# Patient Record
Sex: FEMALE | Race: WHITE | NOT HISPANIC OR LATINO | Employment: OTHER | URBAN - METROPOLITAN AREA
[De-identification: names, ages, dates, MRNs, and addresses within clinical notes are randomized per-mention and may not be internally consistent; named-entity substitution may affect disease eponyms.]

---

## 2017-02-05 ENCOUNTER — PATIENT OUTREACH (OUTPATIENT)
Dept: HEALTH INFORMATION MANAGEMENT | Facility: OTHER | Age: 82
End: 2017-02-05

## 2017-02-05 ENCOUNTER — TELEPHONE (OUTPATIENT)
Dept: HEALTH INFORMATION MANAGEMENT | Facility: OTHER | Age: 82
End: 2017-02-05

## 2017-02-05 NOTE — PROGRESS NOTES
Outcome:LEFT VM    Attempt # 1ST    Annual Wellness Visit Scheduling  Scheduling Status: LEFT VM  Care Gap Scheduling (Attempt to Schedule EACH Overdue Care Gap!)  Health Maintenance Due   Topic Date Due   • Annual Wellness Visit  1935   • PAP SMEAR  02/08/1956   • COLONOSCOPY  02/08/1985   • IMM ZOSTER VACCINE  02/08/1995   • BONE DENSITY  02/08/2000   • MAMMOGRAM  07/29/2011         MyChart Activation:  Already Active/Declined/Sent Activation Code

## 2017-02-05 NOTE — TELEPHONE ENCOUNTER
Patient is over the age of 75 and showing overdue for PAP SMEAR, MAMMO, BONE DENSITY, COLONOSCOPY  .    Please reply to this message as to whether these tests are appropriate and I will update the Health Maintenance Topic or contact the patient to schedule.

## 2017-02-06 NOTE — PROGRESS NOTES
Outcome:SCHEDULED ANNUAL WELLNESS VISIT     Attempt # INCOMING CALL     Annual Wellness Visit Scheduling  Scheduling Status: Scheduled; Not Scheduled; SCHEDULED   If Not Scheduled, Choose Reason Why:         Care Gap Scheduling (Attempt to Schedule EACH Overdue Care Gap!)  Health Maintenance Due   Topic Date Due   • Annual Wellness Visit  SCHEDULED ANNUAL    • BONE DENSITY  02/08/2000         MyChart Activation:  Already Active/Declined/Sent Activation Code  NA

## 2017-02-28 ENCOUNTER — APPOINTMENT (OUTPATIENT)
Dept: MEDICAL GROUP | Facility: MEDICAL CENTER | Age: 82
End: 2017-02-28
Payer: MEDICARE

## 2017-03-06 ENCOUNTER — PATIENT OUTREACH (OUTPATIENT)
Dept: HEALTH INFORMATION MANAGEMENT | Facility: OTHER | Age: 82
End: 2017-03-06

## 2017-03-06 NOTE — PROGRESS NOTES
Attempt #:1     Annual Wellness Visit Scheduling  1. Scheduling Status:Scheduled - WILL DO THE AWV WITH PCP AT UPCOMING APPOINTMENT    Care Gap Scheduling (Attempt to Schedule EACH Overdue Care Gap!)     Health Maintenance Due   Topic Date Due   • Annual Wellness Visit  WILL DO THE AWV WITH PCP AT UPCOMING APPOINTMENT   • BONE DENSITY  DID NOT DISCUSS         MyChart Activation: declined

## 2017-03-14 ENCOUNTER — OFFICE VISIT (OUTPATIENT)
Dept: MEDICAL GROUP | Facility: PHYSICIAN GROUP | Age: 82
End: 2017-03-14
Payer: MEDICARE

## 2017-03-14 VITALS
SYSTOLIC BLOOD PRESSURE: 130 MMHG | OXYGEN SATURATION: 93 % | TEMPERATURE: 97.3 F | RESPIRATION RATE: 12 BRPM | HEART RATE: 69 BPM | DIASTOLIC BLOOD PRESSURE: 68 MMHG | WEIGHT: 157 LBS | HEIGHT: 60 IN | BODY MASS INDEX: 30.82 KG/M2

## 2017-03-14 DIAGNOSIS — I63.319 CEREBROVASCULAR ACCIDENT (CVA) DUE TO THROMBOSIS OF MIDDLE CEREBRAL ARTERY, UNSPECIFIED BLOOD VESSEL LATERALITY (HCC): ICD-10-CM

## 2017-03-14 DIAGNOSIS — F43.21 GRIEF: ICD-10-CM

## 2017-03-14 DIAGNOSIS — E66.9 OBESITY (BMI 30-39.9): ICD-10-CM

## 2017-03-14 DIAGNOSIS — F41.9 ANXIETY: ICD-10-CM

## 2017-03-14 DIAGNOSIS — F32.89 OTHER DEPRESSION: ICD-10-CM

## 2017-03-14 DIAGNOSIS — D86.9 SARCOIDOSIS: ICD-10-CM

## 2017-03-14 PROCEDURE — G8432 DEP SCR NOT DOC, RNG: HCPCS | Performed by: FAMILY MEDICINE

## 2017-03-14 PROCEDURE — 4040F PNEUMOC VAC/ADMIN/RCVD: CPT | Performed by: FAMILY MEDICINE

## 2017-03-14 PROCEDURE — G8482 FLU IMMUNIZE ORDER/ADMIN: HCPCS | Performed by: FAMILY MEDICINE

## 2017-03-14 PROCEDURE — G8419 CALC BMI OUT NRM PARAM NOF/U: HCPCS | Performed by: FAMILY MEDICINE

## 2017-03-14 PROCEDURE — 1036F TOBACCO NON-USER: CPT | Performed by: FAMILY MEDICINE

## 2017-03-14 PROCEDURE — 99214 OFFICE O/P EST MOD 30 MIN: CPT | Performed by: FAMILY MEDICINE

## 2017-03-14 PROCEDURE — 1101F PT FALLS ASSESS-DOCD LE1/YR: CPT | Performed by: FAMILY MEDICINE

## 2017-03-14 PROCEDURE — G8598 ASA/ANTIPLAT THER USED: HCPCS | Performed by: FAMILY MEDICINE

## 2017-03-14 ASSESSMENT — PATIENT HEALTH QUESTIONNAIRE - PHQ9
CLINICAL INTERPRETATION OF PHQ2 SCORE: 6
SUM OF ALL RESPONSES TO PHQ QUESTIONS 1-9: 17
5. POOR APPETITE OR OVEREATING: 3 - NEARLY EVERY DAY

## 2017-03-14 NOTE — MR AVS SNAPSHOT
"        Alix Rush   3/14/2017 1:40 PM   Office Visit   MRN: [id number]    Department:  [facility]   Dept Phone:  [phone]    Description:  Female : 1935   Provider:  Yoav Wakefield M.D.           Reason for Visit     Follow-Up dizzy in the AM       Allergies as of 3/14/2017     Allergen Noted Reactions    Bupropion 2009       Cymbalta [Duloxetine Hcl] 2009   Nausea    Darvocet [Propoxyphene N-Apap] 10/22/2009       Dizziness      Fluoxetine 2009       suicidal thoughts       Iodine 2009       Lexapro 2009       Lipitor [Atorvastatin Calcium] 2009       muscle aches        You were diagnosed with     Grief   [291021]       Cerebrovascular accident (CVA) due to thrombosis of middle cerebral artery, unspecified blood vessel laterality (CMS-[facility])   [6848288]       Obesity (BMI 30-39.9)   [315118]       Other depression   [9571193]       Sarcoidosis (CMS-HCC)   [135.ICD-9-CM]         Vital Signs     Blood Pressure Pulse Temperature Respirations Height Weight    130/68 mmHg 69 36.3 °C (97.3 °F) 12 1.511 m (4' 11.5\") 71.215 kg (157 lb)    Body Mass Index Oxygen Saturation Last Menstrual Period Smoking Status          31.19 kg/m2 93% 1974 Never Smoker         Basic Information     Date Of Birth Sex Race Ethnicity Preferred Language    1935 Female White Non- English      Your appointments     May 23, 2017  8:40 AM   Established Patient with Yoav Wakefield M.D.   [facility] ([address] [address]   [phone]           You will be receiving a confirmation call a few days before your appointment from our automated call confirmation system.              Problem List              ICD-10-CM Priority Class Noted - Resolved    Hypertension I10   2009 - Present    Sarcoidosis (CMS-HCC) D86.9   2009 - Present    Depression F32.9   2009 - Present    Nephrolithiasis N20.0   2009 - " Present    Edema R60.9   8/20/2009 - Present    GERD (gastroesophageal reflux disease) K21.9   8/20/2009 - Present    Spinal stenosis, lumbar M48.06   10/22/2009 - Present    Menopause Z78.0   7/16/2010 - Present    Esophageal stricture K22.2   3/19/2012 - Present    Anxiety F41.9   5/10/2012 - Present    JINNY (obstructive sleep apnea) G47.33   7/13/2012 - Present    Obesity E66.9   7/13/2012 - Present    CVA (cerebral vascular accident) (CMS-HCC) I63.9   1/22/2015 - Present    Meningioma (CMS-HCC) D32.9   7/30/2015 - Present    Grief F43.20   5/16/2016 - Present    Asymptomatic PVCs I49.3   6/20/2016 - Present    Vitamin D deficiency disease E55.9   11/11/2016 - Present    Obesity (BMI 30-39.9) E66.9   3/14/2017 - Present      Health Maintenance        Date Due Completion Dates    BONE DENSITY 2/8/2000 ---    MAMMOGRAM 2/6/2018 (Originally 7/29/2011) 7/29/2010, 7/29/2010, 8/4/2005, 8/2/2004, 7/26/2004    PAP SMEAR 2/6/2018 (Originally 2/8/1956) ---    COLONOSCOPY 2/6/2018 (Originally 2/8/1985) ---    IMM ZOSTER VACCINE 2/6/2018 (Originally 2/8/1995) ---    IMM DTaP/Tdap/Td Vaccine (2 - Td) 5/28/2025 5/28/2015, 11/20/2000            Current Immunizations     13-VALENT PCV PREVNAR 11/8/2016    Hep A/HEP B Combined Vaccine (TwinRix) 4/29/2003, 10/22/2002, 9/20/2002    Influenza TIV (IM) 11/13/2014, 11/14/2013    Influenza Vaccine Quad Inj (Preserved) 10/10/2016    Pneumococcal polysaccharide vaccine (PPSV-23) 6/11/2003    TD Vaccine 11/20/2000    Tdap Vaccine 5/28/2015    Tetanus Vaccine 11/20/2000      Below and/or attached are the medications your provider expects you to take. Review all of your home medications and newly ordered medications with your provider and/or pharmacist. Follow medication instructions as directed by your provider and/or pharmacist. Please keep your medication list with you and share with your provider. Update the information when medications are discontinued, doses are changed, or new  medications (including over-the-counter products) are added; and carry medication information at all times in the event of emergency situations     Allergies:  BUPROPION - (reactions not documented)     CYMBALTA - Nausea     DARVOCET - (reactions not documented)     FLUOXETINE - (reactions not documented)     IODINE - (reactions not documented)     LEXAPRO - (reactions not documented)     LIPITOR - (reactions not documented)               Medications  Valid as of: March 14, 2017 -  2:17 PM    Generic Name Brand Name Tablet Size Instructions for use    Aspirin (Tab)  MG Take 81 mg by mouth every day.        Benazepril HCl (Tab) LOTENSIN 5 MG TAKE 1 TABLET BY MOUTH TWICE A DAY        Butalbital-APAP-Caffeine (Tab) FIORICET -40 MG TAKE 1/2 TO 1 TABLET ORALLY EVERY 4 HOURS AS NEEDED FOR HEADACHE        Cholecalciferol (Tab) cholecalciferol 1000 UNIT Take 3 Tabs by mouth every day.        Gabapentin   Take  by mouth.        Simvastatin (Tab) ZOCOR 40 MG TAKE 1 TABLET BY MOUTH EVERY EVENING        .                 Medicines prescribed today were sent to:     [id number] - [place] - [address]    [address] [address]    Phone: [phone] Fax: [phone]    Open 24 Hours?: No      Medication refill instructions:       If your prescription bottle indicates you have medication refills left, it is not necessary to call your provider’s office. Please contact your pharmacy and they will refill your medication.    If your prescription bottle indicates you do not have any refills left, you may request refills at any time through one of the following ways: The online Make My [employer] system (except Urgent Care), by calling your provider’s office, or by asking your pharmacy to contact your provider’s office with a refill request. Medication refills are processed only during regular business hours and may not be available until the next business day. Your provider may request additional  information or to have a follow-up visit with you prior to refilling your medication.   *Please Note: Medication refills are assigned a new Rx number when refilled electronically. Your pharmacy may indicate that no refills were authorized even though a new prescription for the same medication is available at the pharmacy. Please request the medicine by name with the pharmacy before contacting your provider for a refill.           MyChart Status: Patient Declined

## 2017-03-15 NOTE — PATIENT INSTRUCTIONS
Patient given written instructions regarding labs, imaging, medications, referrals, dietary and lifestyle management, and return visit.    Yoav Wakefield MD

## 2017-03-15 NOTE — PROGRESS NOTES
Patient comes in and is still quite depressed and grieving about the loss of her . It's been almost a year. She does have the option to volunteer at her local [employer] and her  has been encouraging for her to do this. Unfortunately her son needs the patient's car to be able to get to work and her son is working on getting enough money together to get his own car so that the patient will be able to be more free. I also told the patient that I thought she should explore the possibility of getting rides from [employer] members to be able to volunteer at the [employer]. She says she is going to look into this.  The patient admits to being depressed. She does not want to take any antidepressants, however, because she's had adverse reactions to many of them.  She denies suicidal ideation.    I reviewed the following    Past Medical History   Diagnosis Date   • Depression    • Hypertension    • Bronchitis    • Renal disorder      stones   • Indigestion    • Arrhythmia    • Stroke (CMS-HCC)      TIAs and CVA   • Fall    • UTI (lower urinary tract infection) 7/18/2013        Past Surgical History   Procedure Laterality Date   • Laparotomy  1957     partial oophorectomy--benign disease   • Colonoscopy  2004     normal   • Eye surgery  2007     bilateral cataracts   • Cholecystectomy  1960   • Abdominal hysterectomy total  1974     benign disease       Allergies   Allergen Reactions   • Bupropion    • Cymbalta [Duloxetine Hcl] Nausea   • Darvocet [Propoxyphene N-Apap]      Dizziness     • Fluoxetine      suicidal thoughts      • Iodine    • Lexapro    • Lipitor [Atorvastatin Calcium]      muscle aches         Current Outpatient Prescriptions   Medication Sig Dispense Refill   • GABAPENTIN PO Take  by mouth.     • simvastatin (ZOCOR) 40 MG Tab TAKE 1 TABLET BY MOUTH EVERY EVENING 90 Tab 3   • vitamin D (CHOLECALCIFEROL) 1000 UNIT Tab Take 3 Tabs by mouth every day. 30 Tab 3   • benazepril (LOTENSIN) 5 MG Tab TAKE 1  TABLET BY MOUTH TWICE A  Tab 3   • aspirin (ASA) 325 MG TABS Take 81 mg by mouth every day.     • acetaminophen/caffeine/butalbital 325-40-50 mg (FIORICET) -40 MG Tab TAKE 1/2 TO 1 TABLET ORALLY EVERY 4 HOURS AS NEEDED FOR HEADACHE 50 Tab 1     No current facility-administered medications for this visit.        Family History   Problem Relation Age of Onset   • Cancer Mother    • Heart Disease Mother    • Arthritis Mother      Lupus   • Heart Disease Father    • Cancer Brother    • Lung Disease Sister       from Lung Cancer at 70       Social History     Social History   • Marital Status:      Spouse Name: N/A   • Number of Children: N/A   • Years of Education: N/A     Occupational History   • Not on file.     Social History Main Topics   • Smoking status: Never Smoker    • Smokeless tobacco: Never Used   • Alcohol Use: Yes      Comment: rare   • Drug Use: No   • Sexual Activity:     Partners: Male     Birth Control/ Protection: Post-Menopausal     Other Topics Concern   • Not on file     Social History Narrative      The patient is well-developed well-nourished and in no acute distress--but she definitely appears depressed. She was tearful during the visit    Pupils equally round and reactive to light    Ears normal    Nose normal    Throat clear    Neck supple no cervical adenopathy no thyromegaly    Chest clear to auscultation    Heart regular rhythm no murmur S3 or S4 appreciated    Back no CVA pain or spasm    Abdomen flat soft good bowel sounds no mass No hepatosplenomegaly no rebound    Skin no rashes or suspicious lesions    DTRs 2+ in ankles knees and biceps    Babinski downgoing bilaterally    Pulses 2+ in all 4 extremities    1. Grief  Patient has been identified as being depressed and appropriate orders and counseling have been given--I want her to get more involved with her [employer]. She says she promises she will do this. I'm going to see her back in 6 weeks to double check on  this. I offered to write a letter of support for her to her  but she said she did not want me to do that today.    2. Cerebrovascular accident (CVA) due to thrombosis of middle cerebral artery, unspecified blood vessel laterality (CMS-[facility])   no evidence of recurrence --patient has occasional dizzy episodes but they only last for a few minutes and then resolve   3. Obesity (BMI 30-39.9)  Patient identified as having weight management issue.  Appropriate orders and counseling given.   4. Other depression  Patient has been identified as being depressed and appropriate orders and counseling have been given   5. Sarcoidosis (CMS-[facility])   quiescent    6. Anxiety   ongoing. This will be helped by volunteering at her [employer] I think      Recheck with me 6 weeks or when necessary    Please note that this dictation was created using voice recognition software. I have worked with consultants from the vendor as well as technical experts from [employer] to optimize the interface. I have made every reasonable attempt to correct obvious errors, but I expect that there are errors of grammar and possibly content that I did not discover before finalizing the note.

## 2017-05-23 ENCOUNTER — OFFICE VISIT (OUTPATIENT)
Dept: MEDICAL GROUP | Facility: PHYSICIAN GROUP | Age: 82
End: 2017-05-23
Payer: MEDICARE

## 2017-05-23 VITALS
OXYGEN SATURATION: 96 % | WEIGHT: 150 LBS | TEMPERATURE: 97.5 F | SYSTOLIC BLOOD PRESSURE: 150 MMHG | HEART RATE: 74 BPM | HEIGHT: 60 IN | BODY MASS INDEX: 29.45 KG/M2 | DIASTOLIC BLOOD PRESSURE: 80 MMHG | RESPIRATION RATE: 14 BRPM

## 2017-05-23 DIAGNOSIS — F41.9 ANXIETY: ICD-10-CM

## 2017-05-23 DIAGNOSIS — D86.9 SARCOIDOSIS: ICD-10-CM

## 2017-05-23 DIAGNOSIS — F43.21 GRIEF: ICD-10-CM

## 2017-05-23 DIAGNOSIS — Z00.00 HEALTH CARE MAINTENANCE: ICD-10-CM

## 2017-05-23 DIAGNOSIS — I63.333 CEREBROVASCULAR ACCIDENT (CVA) DUE TO BILATERAL THROMBOSIS OF POSTERIOR CEREBRAL ARTERIES (HCC): ICD-10-CM

## 2017-05-23 PROCEDURE — 4040F PNEUMOC VAC/ADMIN/RCVD: CPT | Performed by: FAMILY MEDICINE

## 2017-05-23 PROCEDURE — G8432 DEP SCR NOT DOC, RNG: HCPCS | Performed by: FAMILY MEDICINE

## 2017-05-23 PROCEDURE — 1101F PT FALLS ASSESS-DOCD LE1/YR: CPT | Performed by: FAMILY MEDICINE

## 2017-05-23 PROCEDURE — 1036F TOBACCO NON-USER: CPT | Performed by: FAMILY MEDICINE

## 2017-05-23 PROCEDURE — G8419 CALC BMI OUT NRM PARAM NOF/U: HCPCS | Performed by: FAMILY MEDICINE

## 2017-05-23 PROCEDURE — 99214 OFFICE O/P EST MOD 30 MIN: CPT | Performed by: FAMILY MEDICINE

## 2017-05-23 PROCEDURE — G8598 ASA/ANTIPLAT THER USED: HCPCS | Performed by: FAMILY MEDICINE

## 2017-05-23 NOTE — PATIENT INSTRUCTIONS
Patient given written instructions regarding labs,  medications, referrals, dietary and lifestyle management, and return visit.    Yoav Wakefield MD

## 2017-05-23 NOTE — PROGRESS NOTES
Patient returns. She has started volunteering at her [employer] and this is helped her a lot. She is feeling better. She is not crying as much. She is more positive and her outlook.  She has no shortness of breath.  She has no chest pains.  She still somewhat dizzy, but says that it's better.  She is due for a stoolFIT.    I reviewed the following    Past Medical History   Diagnosis Date   • Depression    • Hypertension    • Bronchitis    • Renal disorder      stones   • Indigestion    • Arrhythmia    • Stroke (CMS-HCC)      TIAs and CVA   • Fall    • UTI (lower urinary tract infection) 7/18/2013        Past Surgical History   Procedure Laterality Date   • Laparotomy  1957     partial oophorectomy--benign disease   • Colonoscopy  2004     normal   • Eye surgery  2007     bilateral cataracts   • Cholecystectomy  1960   • Abdominal hysterectomy total  1974     benign disease       Allergies   Allergen Reactions   • Bupropion    • Cymbalta [Duloxetine Hcl] Nausea   • Darvocet [Propoxyphene N-Apap]      Dizziness     • Fluoxetine      suicidal thoughts      • Iodine    • Lexapro    • Lipitor [Atorvastatin Calcium]      muscle aches         Current Outpatient Prescriptions   Medication Sig Dispense Refill   • GABAPENTIN PO Take  by mouth.     • simvastatin (ZOCOR) 40 MG Tab TAKE 1 TABLET BY MOUTH EVERY EVENING 90 Tab 3   • vitamin D (CHOLECALCIFEROL) 1000 UNIT Tab Take 3 Tabs by mouth every day. 30 Tab 3   • benazepril (LOTENSIN) 5 MG Tab TAKE 1 TABLET BY MOUTH TWICE A  Tab 3   • aspirin (ASA) 325 MG TABS Take 81 mg by mouth every day.     • acetaminophen/caffeine/butalbital 325-40-50 mg (FIORICET) -40 MG Tab TAKE 1/2 TO 1 TABLET ORALLY EVERY 4 HOURS AS NEEDED FOR HEADACHE 50 Tab 1     No current facility-administered medications for this visit.        Family History   Problem Relation Age of Onset   • Cancer Mother    • Heart Disease Mother    • Arthritis Mother      Lupus   • Heart Disease Father    • Cancer  Brother    • Lung Disease Sister       from Lung Cancer at 70       Social History     Social History   • Marital Status:      Spouse Name: N/A   • Number of Children: N/A   • Years of Education: N/A     Occupational History   • Not on file.     Social History Main Topics   • Smoking status: Never Smoker    • Smokeless tobacco: Never Used   • Alcohol Use: Yes      Comment: rare   • Drug Use: No   • Sexual Activity:     Partners: Male     Birth Control/ Protection: Post-Menopausal     Other Topics Concern   • Not on file     Social History Narrative      Physical Exam   Constitutional: She is oriented. She appears well-developed and well-nourished. No distress.   HENT:  Head: Normocephalic and atraumatic.   Right Ear: External ear normal. Ear canal and TM normal   Left Ear: External ear normal. Ear canal and TM normal  Nose: Nose normal.   Mouth/Throat: Oropharynx is clear and moist.   Eyes: Conjunctivae and extraocular motions are normal. Pupils are equal, round, and reactive to light. Fundi benign bilaterally   Neck: No thyromegaly present.   Cardiovascular: Normal rate, regular rhythm, normal heart sounds and intact distal pulses.  Exam reveals no gallop.    No murmur heard.  Pulmonary/Chest: Effort normal and breath sounds normal. No respiratory distress. She has no wheezes. She has no rales.   Abdominal: Soft. Bowel sounds are normal. No hepatosplenomegaly She exhibits no distension. No tenderness. She has no rebound and no guarding.   Musculoskeletal: Normal range of motion. She exhibits no edema and no tenderness. Good strength in her arms and legs. Strong equal  bilaterally.  Lymphadenopathy:     She has no cervical adenopathy.   No supraclavicular adenopathy  Neurological: She is alert and oriented. She has normal reflexes.        Babinskis downgoing bilaterally   Skin: Skin is warm and dry. No rash noted. No erythema.   Psychiatric: She has a normal mood and appropriate affect. Her behavior  is normal. Judgment and thought content normal.     1. Grief   improved now that she is volunteering in her [employer]    2. Health care maintenance  OCCULT BLOOD FECES IMMUNOASSAY   3. Anxiety   improved    4. Cerebrovascular accident (CVA) due to bilateral thrombosis of posterior cerebral arteries (CMS-HCC)   stable     1/2015  Bilateral Cerebellum   5. Sarcoidosis (CMS-HCC)   stable      Recheck 2 months or when necessary    Please note that this dictation was created using voice recognition software. I have worked with consultants from the vendor as well as technical experts from [facility] to optimize the interface. I have made every reasonable attempt to correct obvious errors, but I expect that there are errors of grammar and possibly content that I did not discover before finalizing the note.

## 2017-05-23 NOTE — MR AVS SNAPSHOT
"        Alix Rush   2017 8:40 AM   Office Visit   MRN: [id number]    Department:  [facility]   Dept Phone:  [phone]    Description:  Female : 1935   Provider:  Yoav Wakefield M.D.           Reason for Visit     Follow-Up           Allergies as of 2017     Allergen Noted Reactions    Bupropion 2009       Cymbalta [Duloxetine Hcl] 2009   Nausea    Darvocet [Propoxyphene N-Apap] 10/22/2009       Dizziness      Fluoxetine 2009       suicidal thoughts       Iodine 2009       Lexapro 2009       Lipitor [Atorvastatin Calcium] 2009       muscle aches        You were diagnosed with     Grief   [904312]       Health care maintenance   [208312]       Anxiety   [830272]       Cerebrovascular accident (CVA) due to bilateral thrombosis of posterior cerebral arteries (CMS-HCC)   [9428220]   2015  Bilateral Cerebellum      Vital Signs     Blood Pressure Pulse Temperature Respirations Height Weight    150/80 mmHg 74 36.4 °C (97.5 °F) 14 1.511 m (4' 11.5\") 68.04 kg (150 lb)    Body Mass Index Oxygen Saturation Last Menstrual Period Smoking Status          29.80 kg/m2 96% 1974 Never Smoker         Basic Information     Date Of Birth Sex Race Ethnicity Preferred Language    1935 Female White Non- English      Your appointments     2017 10:20 AM   Established Patient with Yoav Wakefield M.D.   [address] [address] [phone]           You will be receiving a confirmation call a few days before your appointment from our automated call confirmation system.              Problem List              ICD-10-CM Priority Class Noted - Resolved    Hypertension I10   2009 - Present    Sarcoidosis (CMS-HCC) D86.9   2009 - Present    Depression F32.9   2009 - Present    Nephrolithiasis N20.0   2009 - Present    Edema R60.9   2009 - Present    GERD " (gastroesophageal reflux disease) K21.9   8/20/2009 - Present    Spinal stenosis, lumbar M48.06   10/22/2009 - Present    Menopause Z78.0   7/16/2010 - Present    Esophageal stricture K22.2   3/19/2012 - Present    Anxiety F41.9   5/10/2012 - Present    JINNY (obstructive sleep apnea) G47.33   7/13/2012 - Present    Obesity E66.9   7/13/2012 - Present    Meningioma (CMS-HCC) D32.9   7/30/2015 - Present    Grief F43.20   5/16/2016 - Present    Asymptomatic PVCs I49.3   6/20/2016 - Present    Vitamin D deficiency disease E55.9   11/11/2016 - Present    Obesity (BMI 30-39.9) E66.9   3/14/2017 - Present    Cerebrovascular accident (CVA) due to bilateral thrombosis of posterior cerebral arteries (CMS-HCC) I63.333   5/23/2017 - Present      Health Maintenance        Date Due Completion Dates    BONE DENSITY 2/8/2000 ---    MAMMOGRAM 2/6/2018 (Originally 7/29/2011) 7/29/2010, 7/29/2010, 8/4/2005, 8/2/2004, 7/26/2004    PAP SMEAR 2/6/2018 (Originally 2/8/1956) ---    COLONOSCOPY 2/6/2018 (Originally 2/8/1985) ---    IMM ZOSTER VACCINE 2/6/2018 (Originally 2/8/1995) ---    IMM DTaP/Tdap/Td Vaccine (2 - Td) 5/28/2025 5/28/2015, 11/20/2000            Current Immunizations     13-VALENT PCV PREVNAR 11/8/2016    Hep A/HEP B Combined Vaccine (TwinRix) 4/29/2003, 10/22/2002, 9/20/2002    Influenza TIV (IM) 11/13/2014, 11/14/2013    Influenza Vaccine Quad Inj (Preserved) 10/10/2016    Pneumococcal polysaccharide vaccine (PPSV-23) 6/11/2003    TD Vaccine 11/20/2000    Tdap Vaccine 5/28/2015    Tetanus Vaccine 11/20/2000      Below and/or attached are the medications your provider expects you to take. Review all of your home medications and newly ordered medications with your provider and/or pharmacist. Follow medication instructions as directed by your provider and/or pharmacist. Please keep your medication list with you and share with your provider. Update the information when medications are discontinued, doses are changed, or new  medications (including over-the-counter products) are added; and carry medication information at all times in the event of emergency situations     Allergies:  BUPROPION - (reactions not documented)     CYMBALTA - Nausea     DARVOCET - (reactions not documented)     FLUOXETINE - (reactions not documented)     IODINE - (reactions not documented)     LEXAPRO - (reactions not documented)     LIPITOR - (reactions not documented)               Medications  Valid as of: May 23, 2017 -  9:24 AM    Generic Name Brand Name Tablet Size Instructions for use    Aspirin (Tab)  MG Take 81 mg by mouth every day.        Benazepril HCl (Tab) LOTENSIN 5 MG TAKE 1 TABLET BY MOUTH TWICE A DAY        Butalbital-APAP-Caffeine (Tab) FIORICET -40 MG TAKE 1/2 TO 1 TABLET ORALLY EVERY 4 HOURS AS NEEDED FOR HEADACHE        Cholecalciferol (Tab) cholecalciferol 1000 UNIT Take 3 Tabs by mouth every day.        Gabapentin   Take  by mouth.        Simvastatin (Tab) ZOCOR 40 MG TAKE 1 TABLET BY MOUTH EVERY EVENING        .                 Medicines prescribed today were sent to:     [facility]/PHARMACY #[id number] - [place] - [address]    [address] [address]    Phone: [phone] Fax: [phone]    Open 24 Hours?: No      Medication refill instructions:       If your prescription bottle indicates you have medication refills left, it is not necessary to call your provider’s office. Please contact your pharmacy and they will refill your medication.    If your prescription bottle indicates you do not have any refills left, you may request refills at any time through one of the following ways: The online [employer] system (except Urgent Care), by calling your provider’s office, or by asking your pharmacy to contact your provider’s office with a refill request. Medication refills are processed only during regular business hours and may not be available until the next business day. Your provider may request additional  information or to have a follow-up visit with you prior to refilling your medication.   *Please Note: Medication refills are assigned a new Rx number when refilled electronically. Your pharmacy may indicate that no refills were authorized even though a new prescription for the same medication is available at the pharmacy. Please request the medicine by name with the pharmacy before contacting your provider for a refill.        Your To Do List     Future Labs/Procedures Complete By Expires    OCCULT BLOOD FECES IMMUNOASSAY  As directed 5/24/2018         [facility] Status: Patient Declined

## 2017-07-25 ENCOUNTER — HOSPITAL ENCOUNTER (OUTPATIENT)
Dept: LAB | Facility: MEDICAL CENTER | Age: 82
End: 2017-07-25
Attending: FAMILY MEDICINE
Payer: MEDICARE

## 2017-07-25 ENCOUNTER — OFFICE VISIT (OUTPATIENT)
Dept: MEDICAL GROUP | Facility: PHYSICIAN GROUP | Age: 82
End: 2017-07-25
Payer: MEDICARE

## 2017-07-25 VITALS
HEART RATE: 56 BPM | WEIGHT: 146 LBS | BODY MASS INDEX: 28.66 KG/M2 | HEIGHT: 60 IN | SYSTOLIC BLOOD PRESSURE: 128 MMHG | RESPIRATION RATE: 14 BRPM | DIASTOLIC BLOOD PRESSURE: 64 MMHG | TEMPERATURE: 97.5 F | OXYGEN SATURATION: 93 %

## 2017-07-25 DIAGNOSIS — D32.9 MENINGIOMA (HCC): ICD-10-CM

## 2017-07-25 DIAGNOSIS — I10 ESSENTIAL HYPERTENSION: ICD-10-CM

## 2017-07-25 DIAGNOSIS — I63.333 CEREBROVASCULAR ACCIDENT (CVA) DUE TO BILATERAL THROMBOSIS OF POSTERIOR CEREBRAL ARTERIES (HCC): ICD-10-CM

## 2017-07-25 DIAGNOSIS — F32.89 OTHER DEPRESSION: ICD-10-CM

## 2017-07-25 DIAGNOSIS — R94.01 ABNORMAL EEG: ICD-10-CM

## 2017-07-25 DIAGNOSIS — F43.21 GRIEF: ICD-10-CM

## 2017-07-25 DIAGNOSIS — D86.9 SARCOIDOSIS: ICD-10-CM

## 2017-07-25 LAB
T4 FREE SERPL-MCNC: 0.79 NG/DL (ref 0.53–1.43)
TSH SERPL DL<=0.005 MIU/L-ACNC: 2.26 UIU/ML (ref 0.3–3.7)
VIT B12 SERPL-MCNC: 247 PG/ML (ref 211–911)

## 2017-07-25 PROCEDURE — 84439 ASSAY OF FREE THYROXINE: CPT

## 2017-07-25 PROCEDURE — 82607 VITAMIN B-12: CPT

## 2017-07-25 PROCEDURE — 36415 COLL VENOUS BLD VENIPUNCTURE: CPT

## 2017-07-25 PROCEDURE — 99214 OFFICE O/P EST MOD 30 MIN: CPT | Performed by: FAMILY MEDICINE

## 2017-07-25 PROCEDURE — 84443 ASSAY THYROID STIM HORMONE: CPT

## 2017-07-25 ASSESSMENT — PATIENT HEALTH QUESTIONNAIRE - PHQ9
SUM OF ALL RESPONSES TO PHQ QUESTIONS 1-9: 11
CLINICAL INTERPRETATION OF PHQ2 SCORE: 3
5. POOR APPETITE OR OVEREATING: 2 - MORE THAN HALF THE DAYS

## 2017-07-25 NOTE — MR AVS SNAPSHOT
"        Alix Rush   2017 10:20 AM   Office Visit   MRN: [id number]    Department:  [facility]   Dept Phone:  [phone]    Description:  Female : 1935   Provider:  Yoav Wakefield M.D.           Reason for Visit     Follow-Up           Allergies as of 2017     Allergen Noted Reactions    Bupropion 2009       Cymbalta [Duloxetine Hcl] 2009   Nausea    Darvocet [Propoxyphene N-Apap] 10/22/2009       Dizziness      Fluoxetine 2009       suicidal thoughts       Iodine 2009       Lexapro 2009       Lipitor [Atorvastatin Calcium] 2009       muscle aches        You were diagnosed with     Cerebrovascular accident (CVA) due to bilateral thrombosis of posterior cerebral arteries (CMS-HCC)   [0002355]       Grief   [424072]       Essential hypertension   [2920137]       Other depression   [6437468]       Abnormal EEG   [426750]         Vital Signs     Blood Pressure Pulse Temperature Respirations Height Weight    128/64 mmHg 56 36.4 °C (97.5 °F) 14 1.511 m (4' 11.5\") 66.225 kg (146 lb)    Body Mass Index Oxygen Saturation Last Menstrual Period Smoking Status          29.01 kg/m2 93% 1974 Never Smoker         Basic Information     Date Of Birth Sex Race Ethnicity Preferred Language    1935 Female White Non- English      Your appointments     Sep 12, 2017  9:00 AM   Established Patient with Yoav Wakefield M.D.   [address] [address] [phone]           You will be receiving a confirmation call a few days before your appointment from our automated call confirmation system.              Problem List              ICD-10-CM Priority Class Noted - Resolved    Hypertension I10   2009 - Present    Sarcoidosis (CMS-HCC) D86.9   2009 - Present    Depression F32.9   2009 - Present    Nephrolithiasis N20.0   2009 - Present    Edema R60.9   2009 - Present   "    GERD (gastroesophageal reflux disease) K21.9   8/20/2009 - Present    Spinal stenosis, lumbar M48.06   10/22/2009 - Present    Menopause Z78.0   7/16/2010 - Present    Esophageal stricture K22.2   3/19/2012 - Present    Anxiety F41.9   5/10/2012 - Present    JINNY (obstructive sleep apnea) G47.33   7/13/2012 - Present    Obesity E66.9   7/13/2012 - Present    Meningioma (CMS-HCC) D32.9   7/30/2015 - Present    Grief F43.20   5/16/2016 - Present    Asymptomatic PVCs I49.3   6/20/2016 - Present    Vitamin D deficiency disease E55.9   11/11/2016 - Present    Obesity (BMI 30-39.9) E66.9   3/14/2017 - Present    Cerebrovascular accident (CVA) due to bilateral thrombosis of posterior cerebral arteries (CMS-HCC) I63.333   5/23/2017 - Present      Health Maintenance        Date Due Completion Dates    BONE DENSITY 2/8/2000 ---    MAMMOGRAM 2/6/2018 (Originally 7/29/2011) 7/29/2010, 7/29/2010, 8/4/2005, 8/2/2004, 7/26/2004    PAP SMEAR 2/6/2018 (Originally 2/8/1956) ---    COLONOSCOPY 2/6/2018 (Originally 2/8/1985) ---    IMM ZOSTER VACCINE 2/6/2018 (Originally 2/8/1995) ---    IMM INFLUENZA (1) 9/1/2017 10/10/2016, 11/13/2014, 11/14/2013    IMM DTaP/Tdap/Td Vaccine (2 - Td) 5/28/2025 5/28/2015, 11/20/2000            Current Immunizations     13-VALENT PCV PREVNAR 11/8/2016    Hep A/HEP B Combined Vaccine (TwinRix) 4/29/2003, 10/22/2002, 9/20/2002    Influenza TIV (IM) 11/13/2014, 11/14/2013    Influenza Vaccine Quad Inj (Preserved) 10/10/2016    Pneumococcal polysaccharide vaccine (PPSV-23) 6/11/2003    TD Vaccine 11/20/2000    Tdap Vaccine 5/28/2015    Tetanus Vaccine 11/20/2000      Below and/or attached are the medications your provider expects you to take. Review all of your home medications and newly ordered medications with your provider and/or pharmacist. Follow medication instructions as directed by your provider and/or pharmacist. Please keep your medication list with you and share with your provider. Update the  information when medications are discontinued, doses are changed, or new medications (including over-the-counter products) are added; and carry medication information at all times in the event of emergency situations     Allergies:  BUPROPION - (reactions not documented)     CYMBALTA - Nausea     DARVOCET - (reactions not documented)     FLUOXETINE - (reactions not documented)     IODINE - (reactions not documented)     LEXAPRO - (reactions not documented)     LIPITOR - (reactions not documented)               Medications  Valid as of: July 25, 2017 - 10:56 AM    Generic Name Brand Name Tablet Size Instructions for use    Aspirin (Tab)  MG Take 81 mg by mouth every day.        Benazepril HCl (Tab) LOTENSIN 5 MG TAKE 1 TABLET BY MOUTH TWICE A DAY        Butalbital-APAP-Caffeine (Tab) FIORICET -40 MG TAKE 1/2 TO 1 TABLET ORALLY EVERY 4 HOURS AS NEEDED FOR HEADACHE        Cholecalciferol (Tab) cholecalciferol 1000 UNIT Take 3 Tabs by mouth every day.        Gabapentin   Take  by mouth.        Simvastatin (Tab) ZOCOR 40 MG TAKE 1 TABLET BY MOUTH EVERY EVENING        .                 Medicines prescribed today were sent to:     [facility]/PHARMACY #[id number] - [place] - [address]    [address] [address]    Phone: [phone] Fax: [phone]    Open 24 Hours?: No      Medication refill instructions:       If your prescription bottle indicates you have medication refills left, it is not necessary to call your provider’s office. Please contact your pharmacy and they will refill your medication.    If your prescription bottle indicates you do not have any refills left, you may request refills at any time through one of the following ways: The online [employer] system (except Urgent Care), by calling your provider’s office, or by asking your pharmacy to contact your provider’s office with a refill request. Medication refills are processed only during regular business hours and may not be available  until the next business day. Your provider may request additional information or to have a follow-up visit with you prior to refilling your medication.   *Please Note: Medication refills are assigned a new Rx number when refilled electronically. Your pharmacy may indicate that no refills were authorized even though a new prescription for the same medication is available at the pharmacy. Please request the medicine by name with the pharmacy before contacting your provider for a refill.        Your To Do List     Future Labs/Procedures Complete By Expires    FREE THYROXINE  As directed 7/26/2018    TSH  As directed 7/26/2018    VITAMIN B12  As directed 7/25/2018      Referral     A referral request has been sent to our patient care coordination department. Please allow 3-5 business days for us to process this request and contact you either by phone or mail. If you do not hear from us by the 5th business day, please call us at [phone].           [employer] Access Code: [email or web address]  Expires: 8/24/2017 10:56 AM    [employer]  A secure, online tool to manage your health information     [employer]’s [employer]® is a secure, online tool that connects you to your personalized health information from the privacy of your home -- day or night - making it very easy for you to manage your healthcare. Once the activation process is completed, you can even access your medical information using the [employer] mery, which is available for free in the Apple Mery store or Google Play store.     [employer] provides the following levels of access (as shown below):   My Chart Features   [facility] Doctor [facility]  Specialists [facility]  Urgent  Care Non-[facility]  Primary Care  Doctor   Email your healthcare team securely and privately 24/7 X X X    Manage appointments: schedule your next appointment; view details of past/upcoming appointments X      Request prescription refills. X      View recent personal medical records, including lab  and immunizations X X X X   View health record, including health history, allergies, medications X X X X   Read reports about your outpatient visits, procedures, consult and ER notes X X X X   See your discharge summary, which is a recap of your hospital and/or ER visit that includes your diagnosis, lab results, and care plan. X X       How to register for Anywhere to Go:  1. Go to  [email or web address].  2. Click on the Sign Up Now box, which takes you to the New Member Sign Up page. You will need to provide the following information:  a. Enter your Anywhere to Go Access Code exactly as it appears at the top of this page. (You will not need to use this code after you’ve completed the sign-up process. If you do not sign up before the expiration date, you must request a new code.)   b. Enter your date of birth.   c. Enter your home email address.   d. Click Submit, and follow the next screen’s instructions.  3. Create a Anywhere to Go ID. This will be your Anywhere to Go login ID and cannot be changed, so think of one that is secure and easy to remember.  4. Create a Anywhere to Go password. You can change your password at any time.  5. Enter your Password Reset Question and Answer. This can be used at a later time if you forget your password.   6. Enter your e-mail address. This allows you to receive e-mail notifications when new information is available in Anywhere to Go.  7. Click Sign Up. You can now view your health information.    For assistance activating your Anywhere to Go account, call [phone]

## 2017-07-25 NOTE — PROGRESS NOTES
Patient comes in stating that she had a communication problem with Dr. Jones and she does not want to see him again. She's willing to go to another neurologist, however. She has a history of a CVA as well as an abnormal EEG and she has also a meningioma. I do want her to continue seeing a neurologist.  She needs lab work done.  She continues to grieve about her late  but says that her activities in her [employer] have been very helpful for her.    I reviewed the following    Past Medical History   Diagnosis Date   • Depression    • Hypertension    • Bronchitis    • Renal disorder      stones   • Indigestion    • Arrhythmia    • Stroke (CMS-HCC)      TIAs and CVA   • Fall    • UTI (lower urinary tract infection) 7/18/2013        Past Surgical History   Procedure Laterality Date   • Laparotomy  1957     partial oophorectomy--benign disease   • Colonoscopy  2004     normal   • Eye surgery  2007     bilateral cataracts   • Cholecystectomy  1960   • Abdominal hysterectomy total  1974     benign disease       Allergies   Allergen Reactions   • Bupropion    • Cymbalta [Duloxetine Hcl] Nausea   • Darvocet [Propoxyphene N-Apap]      Dizziness     • Fluoxetine      suicidal thoughts      • Iodine    • Lexapro    • Lipitor [Atorvastatin Calcium]      muscle aches         Current Outpatient Prescriptions   Medication Sig Dispense Refill   • GABAPENTIN PO Take  by mouth.     • simvastatin (ZOCOR) 40 MG Tab TAKE 1 TABLET BY MOUTH EVERY EVENING 90 Tab 3   • vitamin D (CHOLECALCIFEROL) 1000 UNIT Tab Take 3 Tabs by mouth every day. 30 Tab 3   • aspirin (ASA) 325 MG TABS Take 81 mg by mouth every day.     • benazepril (LOTENSIN) 5 MG Tab TAKE 1 TABLET BY MOUTH TWICE A  Tab 3   • acetaminophen/caffeine/butalbital 325-40-50 mg (FIORICET) -40 MG Tab TAKE 1/2 TO 1 TABLET ORALLY EVERY 4 HOURS AS NEEDED FOR HEADACHE 50 Tab 1     No current facility-administered medications for this visit.        Family History   Problem  Relation Age of Onset   • Cancer Mother    • Heart Disease Mother    • Arthritis Mother      Lupus   • Heart Disease Father    • Cancer Brother    • Lung Disease Sister       from Lung Cancer at 70       Social History     Social History   • Marital Status:      Spouse Name: N/A   • Number of Children: N/A   • Years of Education: N/A     Occupational History   • Not on file.     Social History Main Topics   • Smoking status: Never Smoker    • Smokeless tobacco: Never Used   • Alcohol Use: Yes      Comment: rare   • Drug Use: No   • Sexual Activity:     Partners: Male     Birth Control/ Protection: Post-Menopausal     Other Topics Concern   • Not on file     Social History Narrative        Physical Exam   Constitutional: She is oriented. She appears well-developed and well-nourished. No distress.   HENT:  Head: Normocephalic and atraumatic.   Right Ear: External ear normal. Ear canal and TM normal   Left Ear: External ear normal. Ear canal and TM normal  Nose: Nose normal.   Mouth/Throat: Oropharynx is clear and moist.   Eyes: Conjunctivae and extraocular motions are normal. Pupils are equal, round, and reactive to light. Fundi benign bilaterally   Neck: No thyromegaly present.   Cardiovascular: Normal rate, regular rhythm, normal heart sounds and intact distal pulses.  Exam reveals no gallop.    No murmur heard.  Pulmonary/Chest: Effort normal and breath sounds normal. No respiratory distress. She has no wheezes. She has no rales.   Abdominal: Soft. Bowel sounds are normal. No hepatosplenomegaly She exhibits no distension. No tenderness. She has no rebound and no guarding.   Musculoskeletal: Normal range of motion. She exhibits no edema and no tenderness.   Lymphadenopathy:     She has no cervical adenopathy.   No supraclavicular adenopathy  Neurological: She is alert and oriented. She has normal reflexes.  is equal and strong bilaterally. She has good symmetrical strength and movement of her legs.        Babinskis downgoing bilaterally   Skin: Skin is warm and dry. No rash noted. No erythema.   Psychiatric: She has a normal mood and appropriate affect. Her behavior is normal. Judgment and thought content normal.     1. Cerebrovascular accident (CVA) due to bilateral thrombosis of posterior cerebral arteries (CMS-HCC)  REFERRAL TO NEUROLOGY--I still want the patient to continue seeing a neurologist. I did a referral to [facility] asking that she possibly could see another neurologist besides Dr. Jones, whom she refuses to see.     FREE THYROXINE    TSH    VITAMIN B12   2. Grief   continued involvement in [place]    3. Essential hypertension   controlled    4. Other depression  FREE THYROXINE    TSH   5. Abnormal EEG  REFERRAL TO NEUROLOGY--as above     FREE THYROXINE    TSH    VITAMIN B12   6. Sarcoidosis (CMS-HCC)   quiescent    7. Meningioma (CMS-HCC)   continue to see neurologist      Recheck with me 2 months or when necessary    Please note that this dictation was created using voice recognition software. I have worked with consultants from the vendor as well as technical experts from [facility] to optimize the interface. I have made every reasonable attempt to correct obvious errors, but I expect that there are errors of grammar and possibly content that I did not discover before finalizing the note.

## 2017-07-26 ENCOUNTER — TELEPHONE (OUTPATIENT)
Dept: MEDICAL GROUP | Facility: PHYSICIAN GROUP | Age: 82
End: 2017-07-26

## 2017-07-26 NOTE — Clinical Note
July 26, 2017         Alix Rush  [address] [address]            Dear Alix Rehman vitamin B 12 and thyroid levels are all normal.     Resulted Orders   VITAMIN B12   Result Value Ref Range    Vitamin B12 -True Cobalamin 247 211 - 911 pg/mL   FREE THYROXINE   Result Value Ref Range    Free T-4 0.79 0.53 - 1.43 ng/dL   TSH   Result Value Ref Range    TSH 2.260 0.300 - 3.700 uIU/mL         If you have any questions or concerns, please don't hesitate to call.        Sincerely,      Yoav Wakefield M.D.    Electronically Signed

## 2017-07-26 NOTE — TELEPHONE ENCOUNTER
----- Message from Yoav Wakefield M.D. sent at 7/26/2017  8:49 AM PDT -----  Dear Alix    Your vitamin B 12 and thyroid levels are all normal.    Yoav Wakefield MD

## 2017-09-10 DIAGNOSIS — I10 ESSENTIAL HYPERTENSION: ICD-10-CM

## 2017-09-11 RX ORDER — BENAZEPRIL HYDROCHLORIDE 5 MG/1
TABLET ORAL
Qty: 180 TAB | Refills: 3 | Status: SHIPPED | OUTPATIENT
Start: 2017-09-11 | End: 2018-09-26 | Stop reason: SDUPTHER

## 2017-09-12 ENCOUNTER — OFFICE VISIT (OUTPATIENT)
Dept: MEDICAL GROUP | Facility: PHYSICIAN GROUP | Age: 82
End: 2017-09-12
Payer: MEDICARE

## 2017-09-12 VITALS
HEART RATE: 70 BPM | DIASTOLIC BLOOD PRESSURE: 58 MMHG | HEIGHT: 60 IN | OXYGEN SATURATION: 93 % | BODY MASS INDEX: 27.88 KG/M2 | SYSTOLIC BLOOD PRESSURE: 112 MMHG | TEMPERATURE: 98.3 F | RESPIRATION RATE: 16 BRPM | WEIGHT: 142 LBS

## 2017-09-12 DIAGNOSIS — I10 ESSENTIAL HYPERTENSION: ICD-10-CM

## 2017-09-12 DIAGNOSIS — F43.21 GRIEF: ICD-10-CM

## 2017-09-12 DIAGNOSIS — D32.9 MENINGIOMA (HCC): ICD-10-CM

## 2017-09-12 DIAGNOSIS — F32.1 MODERATE SINGLE CURRENT EPISODE OF MAJOR DEPRESSIVE DISORDER (HCC): ICD-10-CM

## 2017-09-12 DIAGNOSIS — I63.333 CEREBROVASCULAR ACCIDENT (CVA) DUE TO BILATERAL THROMBOSIS OF POSTERIOR CEREBRAL ARTERIES (HCC): ICD-10-CM

## 2017-09-12 DIAGNOSIS — T63.2X1D: ICD-10-CM

## 2017-09-12 PROCEDURE — 99214 OFFICE O/P EST MOD 30 MIN: CPT | Performed by: FAMILY MEDICINE

## 2017-09-12 ASSESSMENT — PATIENT HEALTH QUESTIONNAIRE - PHQ9
5. POOR APPETITE OR OVEREATING: 1 - SEVERAL DAYS
SUM OF ALL RESPONSES TO PHQ QUESTIONS 1-9: 15
CLINICAL INTERPRETATION OF PHQ2 SCORE: 6

## 2017-09-12 NOTE — PROGRESS NOTES
Patient comes in. She is still missing her   quite a bit. She's been getting more involved with her [employer] and this is been helpful for her. I encouraged her to continue with this endeavor.  Patient has no chest pains or shortness of breath.  She has started seeing Dr. Jensen for neurology and likes him very much. She is going to continue to see him for her neurological needs.  She was stung by a scorpion in her right index finger last month when she was visiting family in [place]. She was taken to a doctor there and given 2 different medications. She doesn't remember the names of those but she says that things are now better. She still has a little bit of numbness in the finger. Her tetanus status is current.    I reviewed the following    Past Medical History:   Diagnosis Date   • UTI (lower urinary tract infection) 2013   • Arrhythmia    • Bronchitis    • Depression    • Fall    • Hypertension    • Indigestion    • Renal disorder     stones   • Stroke (CMS-[facility])     TIAs and CVA        Past Surgical History:   Procedure Laterality Date   • EYE SURGERY      bilateral cataracts   • COLONOSCOPY      normal   • ABDOMINAL HYSTERECTOMY TOTAL      benign disease   • CHOLECYSTECTOMY     • LAPAROTOMY      partial oophorectomy--benign disease       Allergies   Allergen Reactions   • Bupropion    • Cymbalta [Duloxetine Hcl] Nausea   • Darvocet [Propoxyphene N-Apap]      Dizziness     • Fluoxetine      suicidal thoughts      • Iodine    • Lexapro    • Lipitor [Atorvastatin Calcium]      muscle aches         Current Outpatient Prescriptions   Medication Sig Dispense Refill   • benazepril (LOTENSIN) 5 MG Tab TAKE 1 TABLET BY MOUTH TWICE A  Tab 3   • GABAPENTIN PO Take  by mouth.     • simvastatin (ZOCOR) 40 MG Tab TAKE 1 TABLET BY MOUTH EVERY EVENING 90 Tab 3   • vitamin D (CHOLECALCIFEROL) 1000 UNIT Tab Take 3 Tabs by mouth every day. 30 Tab 3   • aspirin (ASA) 325 MG TABS Take  81 mg by mouth every day.     • acetaminophen/caffeine/butalbital 325-40-50 mg (FIORICET) -40 MG Tab TAKE 1/2 TO 1 TABLET ORALLY EVERY 4 HOURS AS NEEDED FOR HEADACHE (Patient not taking: Reported on 2017) 50 Tab 1     No current facility-administered medications for this visit.         Family History   Problem Relation Age of Onset   • Cancer Mother    • Heart Disease Mother    • Arthritis Mother      Lupus   • Heart Disease Father    • Cancer Brother    • Lung Disease Sister       from Lung Cancer at 70       Social History     Social History   • Marital status:      Spouse name: N/A   • Number of children: N/A   • Years of education: N/A     Occupational History   • Not on file.     Social History Main Topics   • Smoking status: Never Smoker   • Smokeless tobacco: Never Used   • Alcohol use Yes      Comment: rare   • Drug use: No   • Sexual activity: Yes     Partners: Male     Birth control/ protection: Post-Menopausal     Other Topics Concern   • Not on file     Social History Narrative   • No narrative on file        Physical Exam   Constitutional: She is oriented. She appears well-developed and well-nourished. No distress.   HENT:  Head: Normocephalic and atraumatic.   Right Ear: External ear normal. Ear canal and TM normal   Left Ear: External ear normal. Ear canal and TM normal  Nose: Nose normal.   Mouth/Throat: Oropharynx is clear and moist.   Eyes: Conjunctivae and extraocular motions are normal. Pupils are equal, round, and reactive to light. Fundi benign bilaterally   Neck: No thyromegaly present.   Cardiovascular: Normal rate, regular rhythm, normal heart sounds and intact distal pulses.  Exam reveals no gallop.    No murmur heard.  Pulmonary/Chest: Effort normal and breath sounds normal. No respiratory distress. She has no wheezes. She has no rales.   Abdominal: Soft. Bowel sounds are normal. No hepatosplenomegaly She exhibits no distension. No tenderness. She has no rebound and  no guarding.   Musculoskeletal: Normal range of motion. She exhibits no edema and no tenderness.   Lymphadenopathy:     She has no cervical adenopathy.   No supraclavicular adenopathy  Neurological: She is alert and oriented. She has normal reflexes. She has a slight amount of numbness on the tip of her right index finger. Good range of motion.       Babinskis downgoing bilaterally   Skin: Skin is warm and dry. No rash noted. No erythema.   Psychiatric: She has a normal mood and appropriate affect. Her behavior is normal. Judgment and thought content normal.     1. Cerebrovascular accident (CVA) due to bilateral thrombosis of posterior cerebral arteries (CMS-HCC)   Continue to see Dr. Jensen     Sees Dr Jensen Neurology   2. Grief  Patient has been identified as being depressed and appropriate orders and counseling have been given   3. Essential hypertension   Controlled    4. Moderate single current episode of major depressive disorder (CMS-HCC)   Continue with involvement with Islam    5. Scorpion sting, accidental or unintentional, subsequent encounter   Resolved     [facility]inger in [place] 8/2017   6. Meningioma (CMS-HCC)   Continue to see Dr. Jensen     Sees Dr Jensen Neurology     Please note that this dictation was created using voice recognition software. I have worked with consultants from the vendor as well as technical experts from [facility] to optimize the interface. I have made every reasonable attempt to correct obvious errors, but I expect that there are errors of grammar and possibly content that I did not discover before finalizing the note.    Recheck one month or when necessary

## 2017-10-24 ENCOUNTER — TELEPHONE (OUTPATIENT)
Dept: MEDICAL GROUP | Facility: PHYSICIAN GROUP | Age: 82
End: 2017-10-24

## 2017-10-25 ENCOUNTER — OFFICE VISIT (OUTPATIENT)
Dept: MEDICAL GROUP | Facility: PHYSICIAN GROUP | Age: 82
End: 2017-10-25
Payer: MEDICARE

## 2017-10-25 VITALS
OXYGEN SATURATION: 96 % | WEIGHT: 138 LBS | HEART RATE: 69 BPM | SYSTOLIC BLOOD PRESSURE: 138 MMHG | TEMPERATURE: 97.5 F | RESPIRATION RATE: 14 BRPM | BODY MASS INDEX: 27.82 KG/M2 | HEIGHT: 59 IN | DIASTOLIC BLOOD PRESSURE: 68 MMHG

## 2017-10-25 DIAGNOSIS — Z23 NEEDS FLU SHOT: ICD-10-CM

## 2017-10-25 DIAGNOSIS — F32.1 MODERATE SINGLE CURRENT EPISODE OF MAJOR DEPRESSIVE DISORDER (HCC): ICD-10-CM

## 2017-10-25 DIAGNOSIS — F43.21 GRIEF: ICD-10-CM

## 2017-10-25 PROCEDURE — G0008 ADMIN INFLUENZA VIRUS VAC: HCPCS | Performed by: FAMILY MEDICINE

## 2017-10-25 PROCEDURE — 99213 OFFICE O/P EST LOW 20 MIN: CPT | Mod: 25 | Performed by: FAMILY MEDICINE

## 2017-10-25 PROCEDURE — 90662 IIV NO PRSV INCREASED AG IM: CPT | Performed by: FAMILY MEDICINE

## 2017-10-25 RX ORDER — DOXEPIN HYDROCHLORIDE 10 MG/1
10 CAPSULE ORAL
Qty: 30 CAP | Refills: 1 | Status: SHIPPED | OUTPATIENT
Start: 2017-10-25 | End: 2017-11-15 | Stop reason: SDUPTHER

## 2017-10-25 ASSESSMENT — PATIENT HEALTH QUESTIONNAIRE - PHQ9
SUM OF ALL RESPONSES TO PHQ QUESTIONS 1-9: 18
2. FEELING DOWN, DEPRESSED, IRRITABLE, OR HOPELESS: 3
1. LITTLE INTEREST OR PLEASURE IN DOING THINGS: 2
SUM OF ALL RESPONSES TO PHQ QUESTIONS 1-9: 17
9. THOUGHTS THAT YOU WOULD BE BETTER OFF DEAD, OR OF HURTING YOURSELF: 1
5. POOR APPETITE OR OVEREATING: 1
7. TROUBLE CONCENTRATING ON THINGS, SUCH AS READING THE NEWSPAPER OR WATCHING TELEVISION: 2
SUM OF ALL RESPONSES TO PHQ9 QUESTIONS 1 AND 2: 5
CLINICAL INTERPRETATION OF PHQ2 SCORE: 6
4. FEELING TIRED OR HAVING LITTLE ENERGY: 0
6. FEELING BAD ABOUT YOURSELF - OR THAT YOU ARE A FAILURE OR HAVE LET YOURSELF OR YOUR FAMILY DOWN: 3
8. MOVING OR SPEAKING SO SLOWLY THAT OTHER PEOPLE COULD HAVE NOTICED. OR THE OPPOSITE, BEING SO FIGETY OR RESTLESS THAT YOU HAVE BEEN MOVING AROUND A LOT MORE THAN USUAL: 2
5. POOR APPETITE OR OVEREATING: 2 - MORE THAN HALF THE DAYS
3. TROUBLE FALLING OR STAYING ASLEEP OR SLEEPING TOO MUCH: 3

## 2017-10-25 NOTE — PATIENT INSTRUCTIONS
Patient given written instructions regarding  medications, referrals, dietary and lifestyle management, and return visit.    Yoav Wakefield MD

## 2017-10-25 NOTE — PROGRESS NOTES
Patient comes in. She is getting increasingly despondent and depressed since the loss of her . She is going to [employer] but says this is not quite enough. I offered to refer her for psychological counseling or psychiatrist and she refused that. She is willing to try an antidepressant. In the past we've had problems with side effects from various antidepressants.  She is seeing Dr. Jensen the neurologist who agrees that an antidepressant would be helpful for her.  She is due for a flu shot.    I reviewed the following    Past Medical History:   Diagnosis Date   • Arrhythmia    • Bronchitis    • Depression    • Fall    • Hypertension    • Indigestion    • Renal disorder     stones   • Stroke (CMS-[facility])     TIAs and CVA   • UTI (lower urinary tract infection) 7/18/2013        Past Surgical History:   Procedure Laterality Date   • EYE SURGERY  2007    bilateral cataracts   • COLONOSCOPY  2004    normal   • ABDOMINAL HYSTERECTOMY TOTAL  1974    benign disease   • CHOLECYSTECTOMY  1960   • LAPAROTOMY  1957    partial oophorectomy--benign disease       Allergies   Allergen Reactions   • Bupropion    • Cymbalta [Duloxetine Hcl] Nausea   • Darvocet [Propoxyphene N-Apap]      Dizziness     • Fluoxetine      suicidal thoughts      • Iodine    • Lexapro    • Lipitor [Atorvastatin Calcium]      muscle aches         Current Outpatient Prescriptions   Medication Sig Dispense Refill   • doxepin (SINEQUAN) 10 MG Cap Take 1 Cap by mouth every bedtime. 30 Cap 1   • benazepril (LOTENSIN) 5 MG Tab TAKE 1 TABLET BY MOUTH TWICE A  Tab 3   • simvastatin (ZOCOR) 40 MG Tab TAKE 1 TABLET BY MOUTH EVERY EVENING 90 Tab 3   • vitamin D (CHOLECALCIFEROL) 1000 UNIT Tab Take 3 Tabs by mouth every day. 30 Tab 3   • aspirin (ASA) 325 MG TABS Take 81 mg by mouth every day.     • GABAPENTIN PO Take  by mouth.     • acetaminophen/caffeine/butalbital 325-40-50 mg (FIORICET) -40 MG Tab TAKE 1/2 TO 1 TABLET ORALLY EVERY 4 HOURS AS NEEDED  FOR HEADACHE (Patient not taking: Reported on 2017) 50 Tab 1     No current facility-administered medications for this visit.         Family History   Problem Relation Age of Onset   • Cancer Mother    • Heart Disease Mother    • Arthritis Mother      Lupus   • Heart Disease Father    • Cancer Brother    • Lung Disease Sister       from Lung Cancer at 70       Social History     Social History   • Marital status:      Spouse name: N/A   • Number of children: N/A   • Years of education: N/A     Occupational History   • Not on file.     Social History Main Topics   • Smoking status: Never Smoker   • Smokeless tobacco: Never Used   • Alcohol use Yes      Comment: rare   • Drug use: No   • Sexual activity: Yes     Partners: Male     Birth control/ protection: Post-Menopausal     Other Topics Concern   • Not on file     Social History Narrative   • No narrative on file        The patient is well-developed well-nourished and in no acute distress    Pupils equally round and reactive to light    Ears normal    Nose normal    Throat clear    Neck supple no cervical adenopathy no thyromegaly    Chest clear to auscultation    Heart regular rhythm no murmur S3 or S4 appreciated    Back no CVA pain or spasm    Abdomen flat soft good bowel sounds no mass No hepatosplenomegaly no rebound    Skin no rashes or suspicious lesions    DTRs 2+ in ankles knees and biceps    Babinski downgoing bilaterally    Pulses 2+ in all 4 extremities    Depression Screen (PHQ-2/PHQ-9) 2017 10/25/2017 10/25/2017   PHQ-2 Total Score - - 5   PHQ-2 Total Score - - -   PHQ-2 Total Score 6 6 -   PHQ-9 Total Score - - 17   PHQ-9 Total Score 15 18 -       Interpretation of PHQ-9 Total Score   Score Severity   1-4 Minimal Depression   5-9 Mild Depression   10-14 Moderate Depression   15-19 Moderately Severe Depression   20-27 Severe Depression    1. Grief  doxepin (SINEQUAN) 10 MG Cap--We are going to start with this at one by mouth daily  at bedtime. This may be a low dose for someone her size but I'm going to start low and we'll see how she tolerates it. Recheck with me in one month. I encouraged her to continue going to her [employer].    2. Moderate single current episode of major depressive disorder (CMS-HCC)  doxepin (SINEQUAN) 10 MG Cap   3. Needs flu shot  INFLUENZA VACCINE, HIGH DOSE (65+ ONLY)     Please note that this dictation was created using voice recognition software. I have worked with consultants from the vendor as well as technical experts from [facility] [employer] to optimize the interface. I have made every reasonable attempt to correct obvious errors, but I expect that there are errors of grammar and possibly content that I did not discover before finalizing the note.

## 2017-10-31 ENCOUNTER — TELEPHONE (OUTPATIENT)
Dept: MEDICAL GROUP | Facility: PHYSICIAN GROUP | Age: 82
End: 2017-10-31

## 2017-11-01 NOTE — TELEPHONE ENCOUNTER
That is because I started her on very low dose. I did not want to give her too high a dose which would be overly sedating. I think she should hang in there and stay on this 10 mg dose for at least another week.Yoav Wakefield MD

## 2017-11-01 NOTE — TELEPHONE ENCOUNTER
VOICEMAIL  1. Caller Name: Alix A Clauson                        Call Back Number: [phone] (home)       2. Message: pt is calling stating that the doxepin (SINEQUAN) 10 MG Capdoxepin (SINEQUAN) 10 MG Cap is not doing anything for her. Please advise.     3. Patient approves office to leave a detailed voicemail/MyChart message: N\A

## 2017-11-14 RX ORDER — SIMVASTATIN 40 MG
TABLET ORAL
Qty: 90 TAB | Refills: 3 | Status: SHIPPED | OUTPATIENT
Start: 2017-11-14 | End: 2018-12-13 | Stop reason: SDUPTHER

## 2017-11-15 ENCOUNTER — OFFICE VISIT (OUTPATIENT)
Dept: MEDICAL GROUP | Facility: PHYSICIAN GROUP | Age: 82
End: 2017-11-15
Payer: MEDICARE

## 2017-11-15 VITALS
SYSTOLIC BLOOD PRESSURE: 142 MMHG | HEIGHT: 59 IN | HEART RATE: 88 BPM | WEIGHT: 140 LBS | RESPIRATION RATE: 14 BRPM | TEMPERATURE: 97.5 F | OXYGEN SATURATION: 92 % | DIASTOLIC BLOOD PRESSURE: 70 MMHG | BODY MASS INDEX: 28.22 KG/M2

## 2017-11-15 DIAGNOSIS — I10 ESSENTIAL HYPERTENSION: ICD-10-CM

## 2017-11-15 DIAGNOSIS — F32.1 MODERATE SINGLE CURRENT EPISODE OF MAJOR DEPRESSIVE DISORDER (HCC): ICD-10-CM

## 2017-11-15 DIAGNOSIS — F43.21 GRIEF: ICD-10-CM

## 2017-11-15 DIAGNOSIS — F32.9 REACTIVE DEPRESSION: ICD-10-CM

## 2017-11-15 DIAGNOSIS — F41.9 ANXIETY: ICD-10-CM

## 2017-11-15 PROCEDURE — 99214 OFFICE O/P EST MOD 30 MIN: CPT | Performed by: FAMILY MEDICINE

## 2017-11-15 RX ORDER — DOXEPIN HYDROCHLORIDE 10 MG/1
10 CAPSULE ORAL
Qty: 90 CAP | Refills: 1 | Status: SHIPPED | OUTPATIENT
Start: 2017-11-15 | End: 2018-03-21 | Stop reason: SDUPTHER

## 2017-11-15 ASSESSMENT — PATIENT HEALTH QUESTIONNAIRE - PHQ9
SUM OF ALL RESPONSES TO PHQ9 QUESTIONS 1 AND 2: 1
2. FEELING DOWN, DEPRESSED, IRRITABLE, OR HOPELESS: 0
1. LITTLE INTEREST OR PLEASURE IN DOING THINGS: 1
4. FEELING TIRED OR HAVING LITTLE ENERGY: 2
5. POOR APPETITE OR OVEREATING: 0
SUM OF ALL RESPONSES TO PHQ QUESTIONS 1-9: 5
6. FEELING BAD ABOUT YOURSELF - OR THAT YOU ARE A FAILURE OR HAVE LET YOURSELF OR YOUR FAMILY DOWN: 1
CLINICAL INTERPRETATION OF PHQ2 SCORE: 0
8. MOVING OR SPEAKING SO SLOWLY THAT OTHER PEOPLE COULD HAVE NOTICED. OR THE OPPOSITE, BEING SO FIGETY OR RESTLESS THAT YOU HAVE BEEN MOVING AROUND A LOT MORE THAN USUAL: 1
7. TROUBLE CONCENTRATING ON THINGS, SUCH AS READING THE NEWSPAPER OR WATCHING TELEVISION: 0
3. TROUBLE FALLING OR STAYING ASLEEP OR SLEEPING TOO MUCH: 0
9. THOUGHTS THAT YOU WOULD BE BETTER OFF DEAD, OR OF HURTING YOURSELF: 0

## 2017-11-15 NOTE — PROGRESS NOTES
Patient returns to recheck her depression. She is doing better on 10 mg of doxepin nightly. She is not having side effects that she notices. Things are looking up for her. She has no chest pains and no shortness of breath. She did fall a couple of weeks ago and injured her left anterior calf. She has a hematoma on the calf. She is nontender over the bones, however. I offered to x-ray this and she said she did not want this because it was getting better.    I reviewed the following    Past Medical History:   Diagnosis Date   • Arrhythmia    • Bronchitis    • Depression    • Fall    • Hypertension    • Indigestion    • Renal disorder     stones   • Stroke (CMS-[facility])     TIAs and CVA   • UTI (lower urinary tract infection) 7/18/2013        Past Surgical History:   Procedure Laterality Date   • EYE SURGERY  2007    bilateral cataracts   • COLONOSCOPY  2004    normal   • ABDOMINAL HYSTERECTOMY TOTAL  1974    benign disease   • CHOLECYSTECTOMY  1960   • LAPAROTOMY  1957    partial oophorectomy--benign disease       Allergies   Allergen Reactions   • Bupropion    • Cymbalta [Duloxetine Hcl] Nausea   • Darvocet [Propoxyphene N-Apap]      Dizziness     • Fluoxetine      suicidal thoughts      • Iodine    • Lexapro    • Lipitor [Atorvastatin Calcium]      muscle aches         Current Outpatient Prescriptions   Medication Sig Dispense Refill   • doxepin (SINEQUAN) 10 MG Cap Take 1 Cap by mouth every bedtime. 90 Cap 1   • simvastatin (ZOCOR) 40 MG Tab TAKE 1 TABLET BY MOUTH EVERY EVENING 90 Tab 3   • benazepril (LOTENSIN) 5 MG Tab TAKE 1 TABLET BY MOUTH TWICE A  Tab 3   • GABAPENTIN PO Take  by mouth.     • vitamin D (CHOLECALCIFEROL) 1000 UNIT Tab Take 3 Tabs by mouth every day. 30 Tab 3   • aspirin (ASA) 325 MG TABS Take 81 mg by mouth every day.     • acetaminophen/caffeine/butalbital 325-40-50 mg (FIORICET) -40 MG Tab TAKE 1/2 TO 1 TABLET ORALLY EVERY 4 HOURS AS NEEDED FOR HEADACHE (Patient not taking: Reported  on 2017) 50 Tab 1     No current facility-administered medications for this visit.         Family History   Problem Relation Age of Onset   • Cancer Mother    • Heart Disease Mother    • Arthritis Mother      Lupus   • Heart Disease Father    • Cancer Brother    • Lung Disease Sister       from Lung Cancer at 70       Social History     Social History   • Marital status:      Spouse name: N/A   • Number of children: N/A   • Years of education: N/A     Occupational History   • Not on file.     Social History Main Topics   • Smoking status: Never Smoker   • Smokeless tobacco: Never Used   • Alcohol use Yes      Comment: rare   • Drug use: No   • Sexual activity: Yes     Partners: Male     Birth control/ protection: Post-Menopausal     Other Topics Concern   • Not on file     Social History Narrative   • No narrative on file      The patient is well-developed well-nourished and in no acute distress    Pupils equally round and reactive to light    Ears normal    Nose normal    Throat clear    Neck supple no cervical adenopathy no thyromegaly    Chest clear to auscultation    Heart regular rhythm no murmur S3 or S4 appreciated    Back no CVA pain or spasm    Abdomen flat soft good bowel sounds no mass No hepatosplenomegaly no rebound    Skin hematoma on left anterior calf. She nontender when I percuss over the proximal and distal tibia and fibula, however. No rashes or suspicious lesions    DTRs 2+ in ankles knees and biceps    Babinski downgoing bilaterally    Pulses 2+ in all 4 extremities    Depression Screen (PHQ-2/PHQ-9) 10/25/2017 11/15/2017 11/15/2017   PHQ-2 Total Score 5 - 1   PHQ-2 Total Score - - -   PHQ-2 Total Score - 0 -   PHQ-9 Total Score 17 - 5   PHQ-9 Total Score - - -       Interpretation of PHQ-9 Total Score   Score Severity   1-4 Minimal Depression   5-9 Mild Depression   10-14 Moderate Depression   15-19 Moderately Severe Depression   20-27 Severe Depression    1. Grief  doxepin  (SINEQUAN) 10 MG Cap--Continue one by mouth daily at bedtime    2. Anxiety   Improved    3. Essential hypertension   Controlled    4. Reactive depression   Improved    5. Moderate single current episode of major depressive disorder (CMS-HCC)  doxepin (SINEQUAN) 10 MG Cap     Please note that this dictation was created using voice recognition software. I have worked with consultants from the vendor as well as technical experts from [facility] to optimize the interface. I have made every reasonable attempt to correct obvious errors, but I expect that there are errors of grammar and possibly content that I did not discover before finalizing the note.    Recheck 2 months or when necessary

## 2018-01-15 ENCOUNTER — PATIENT OUTREACH (OUTPATIENT)
Dept: HEALTH INFORMATION MANAGEMENT | Facility: OTHER | Age: 83
End: 2018-01-15

## 2018-01-15 NOTE — PROGRESS NOTES
Attempt #:1    Verify PCP: yes    Communication Preference Obtained: no     Review Care Team: no    Annual Wellness Visit Scheduling  1. Scheduling Status:Not Scheduled. Patient states they are not interested     Care Gap Scheduling (Attempt to Schedule EACH Overdue Care Gap!)     Health Maintenance Due   Topic Date Due   • Annual Wellness Visit  1935   • BONE DENSITY  02/08/2000        Scheduled patient for Annual Wellness Visit       [email or web address] Activation: declined  [email or web address] Mery: no  Virtual Visits: no  Opt In to Text Messages: no

## 2018-01-17 ENCOUNTER — OFFICE VISIT (OUTPATIENT)
Dept: MEDICAL GROUP | Facility: PHYSICIAN GROUP | Age: 83
End: 2018-01-17
Payer: MEDICARE

## 2018-01-17 VITALS
DIASTOLIC BLOOD PRESSURE: 78 MMHG | WEIGHT: 139 LBS | HEART RATE: 72 BPM | TEMPERATURE: 97.7 F | HEIGHT: 59 IN | SYSTOLIC BLOOD PRESSURE: 138 MMHG | BODY MASS INDEX: 28.02 KG/M2 | OXYGEN SATURATION: 93 % | RESPIRATION RATE: 14 BRPM

## 2018-01-17 DIAGNOSIS — I10 ESSENTIAL HYPERTENSION: ICD-10-CM

## 2018-01-17 DIAGNOSIS — I63.333 CEREBROVASCULAR ACCIDENT (CVA) DUE TO BILATERAL THROMBOSIS OF POSTERIOR CEREBRAL ARTERIES (HCC): ICD-10-CM

## 2018-01-17 DIAGNOSIS — F43.21 GRIEF: ICD-10-CM

## 2018-01-17 DIAGNOSIS — D32.9 MENINGIOMA (HCC): ICD-10-CM

## 2018-01-17 DIAGNOSIS — F41.9 ANXIETY: ICD-10-CM

## 2018-01-17 PROCEDURE — 99214 OFFICE O/P EST MOD 30 MIN: CPT | Performed by: FAMILY MEDICINE

## 2018-01-17 ASSESSMENT — PATIENT HEALTH QUESTIONNAIRE - PHQ9
2. FEELING DOWN, DEPRESSED, IRRITABLE, OR HOPELESS: 1
SUM OF ALL RESPONSES TO PHQ QUESTIONS 1-9: 14
6. FEELING BAD ABOUT YOURSELF - OR THAT YOU ARE A FAILURE OR HAVE LET YOURSELF OR YOUR FAMILY DOWN: 1
9. THOUGHTS THAT YOU WOULD BE BETTER OFF DEAD, OR OF HURTING YOURSELF: 0
7. TROUBLE CONCENTRATING ON THINGS, SUCH AS READING THE NEWSPAPER OR WATCHING TELEVISION: 0
8. MOVING OR SPEAKING SO SLOWLY THAT OTHER PEOPLE COULD HAVE NOTICED. OR THE OPPOSITE, BEING SO FIGETY OR RESTLESS THAT YOU HAVE BEEN MOVING AROUND A LOT MORE THAN USUAL: 1
3. TROUBLE FALLING OR STAYING ASLEEP OR SLEEPING TOO MUCH: 0
4. FEELING TIRED OR HAVING LITTLE ENERGY: 2
CLINICAL INTERPRETATION OF PHQ2 SCORE: 3
5. POOR APPETITE OR OVEREATING: 2 - MORE THAN HALF THE DAYS
SUM OF ALL RESPONSES TO PHQ QUESTIONS 1-9: 7
1. LITTLE INTEREST OR PLEASURE IN DOING THINGS: 2
SUM OF ALL RESPONSES TO PHQ9 QUESTIONS 1 AND 2: 3
5. POOR APPETITE OR OVEREATING: 0

## 2018-01-17 NOTE — PROGRESS NOTES
Patient comes in for recheck. The holidays were tough for her because she misses her late . She is feeling better now that things are settling down some. She feels that the doxepin 10 mg daily at bedtime as a good dose for her. It's helping her sleep and she says it is helping her moods. She is a past history of a quiescent meningioma which we have scanned and found to be calcified and stable. She denies headaches. She denies visual problems.    I reviewed the following    Past Medical History:   Diagnosis Date   • Arrhythmia    • Bronchitis    • Depression    • Fall    • Hypertension    • Indigestion    • Renal disorder     stones   • Stroke (CMS-HCC)     TIAs and CVA   • UTI (lower urinary tract infection) 7/18/2013        Past Surgical History:   Procedure Laterality Date   • EYE SURGERY  2007    bilateral cataracts   • COLONOSCOPY  2004    normal   • ABDOMINAL HYSTERECTOMY TOTAL  1974    benign disease   • CHOLECYSTECTOMY  1960   • LAPAROTOMY  1957    partial oophorectomy--benign disease       Allergies   Allergen Reactions   • Bupropion    • Cymbalta [Duloxetine Hcl] Nausea   • Darvocet [Propoxyphene N-Apap]      Dizziness     • Fluoxetine      suicidal thoughts      • Iodine    • Lexapro    • Lipitor [Atorvastatin Calcium]      muscle aches         Current Outpatient Prescriptions   Medication Sig Dispense Refill   • doxepin (SINEQUAN) 10 MG Cap Take 1 Cap by mouth every bedtime. 90 Cap 1   • simvastatin (ZOCOR) 40 MG Tab TAKE 1 TABLET BY MOUTH EVERY EVENING 90 Tab 3   • benazepril (LOTENSIN) 5 MG Tab TAKE 1 TABLET BY MOUTH TWICE A  Tab 3   • GABAPENTIN PO Take  by mouth.     • vitamin D (CHOLECALCIFEROL) 1000 UNIT Tab Take 3 Tabs by mouth every day. 30 Tab 3   • aspirin (ASA) 325 MG TABS Take 81 mg by mouth every day.     • acetaminophen/caffeine/butalbital 325-40-50 mg (FIORICET) -40 MG Tab TAKE 1/2 TO 1 TABLET ORALLY EVERY 4 HOURS AS NEEDED FOR HEADACHE (Patient not taking: Reported on  2017) 50 Tab 1     No current facility-administered medications for this visit.         Family History   Problem Relation Age of Onset   • Cancer Mother    • Heart Disease Mother    • Arthritis Mother      Lupus   • Heart Disease Father    • Cancer Brother    • Lung Disease Sister       from Lung Cancer at 70       Social History     Social History   • Marital status:      Spouse name: N/A   • Number of children: N/A   • Years of education: N/A     Occupational History   • Not on file.     Social History Main Topics   • Smoking status: Never Smoker   • Smokeless tobacco: Never Used   • Alcohol use Yes      Comment: rare   • Drug use: No   • Sexual activity: Yes     Partners: Male     Birth control/ protection: Post-Menopausal     Other Topics Concern   • Not on file     Social History Narrative   • No narrative on file      The patient is well-developed well-nourished and in no acute distress    Pupils equally round and reactive to light    Ears normal    Nose normal    Throat clear    Neck supple no cervical adenopathy no thyromegaly    Chest clear to auscultation    Heart regular rhythm no murmur S3 or S4 appreciated    Back no CVA pain or spasm    Abdomen flat soft good bowel sounds no mass No hepatosplenomegaly no rebound    Skin no rashes or suspicious lesions    DTRs 2+ in ankles knees and biceps    Babinski downgoing bilaterally    Pulses 2+ in all 4 extremities      1. Cerebrovascular accident (CVA) due to bilateral thrombosis of posterior cerebral arteries (CMS-HCC)   Stable    2. Anxiety   Improving    3. Essential hypertension   Controlled    4. Grief   Continue present course. Continue with activity in her [employer]    5. Meningioma (CMS-HCC)   Stable      Recheck with me 2 months or when necessary    Please note that this dictation was created using voice recognition software. I have worked with consultants from the vendor as well as technical experts from [employer] to optimize the  interface. I have made every reasonable attempt to correct obvious errors, but I expect that there are errors of grammar and possibly content that I did not discover before finalizing the note.

## 2018-03-21 ENCOUNTER — OFFICE VISIT (OUTPATIENT)
Dept: MEDICAL GROUP | Facility: PHYSICIAN GROUP | Age: 83
End: 2018-03-21
Payer: MEDICARE

## 2018-03-21 VITALS
WEIGHT: 137 LBS | RESPIRATION RATE: 16 BRPM | SYSTOLIC BLOOD PRESSURE: 130 MMHG | TEMPERATURE: 98.1 F | HEIGHT: 59 IN | HEART RATE: 84 BPM | BODY MASS INDEX: 27.62 KG/M2 | DIASTOLIC BLOOD PRESSURE: 68 MMHG | OXYGEN SATURATION: 95 %

## 2018-03-21 DIAGNOSIS — F32.1 MODERATE SINGLE CURRENT EPISODE OF MAJOR DEPRESSIVE DISORDER (HCC): ICD-10-CM

## 2018-03-21 DIAGNOSIS — I63.333 CEREBROVASCULAR ACCIDENT (CVA) DUE TO BILATERAL THROMBOSIS OF POSTERIOR CEREBRAL ARTERIES (HCC): ICD-10-CM

## 2018-03-21 DIAGNOSIS — F32.A DEPRESSION, UNSPECIFIED DEPRESSION TYPE: ICD-10-CM

## 2018-03-21 DIAGNOSIS — F43.21 GRIEF: ICD-10-CM

## 2018-03-21 DIAGNOSIS — K21.00 GASTROESOPHAGEAL REFLUX DISEASE WITH ESOPHAGITIS: ICD-10-CM

## 2018-03-21 PROBLEM — E66.9 OBESITY (BMI 30-39.9): Status: RESOLVED | Noted: 2017-03-14 | Resolved: 2018-03-21

## 2018-03-21 PROCEDURE — 99214 OFFICE O/P EST MOD 30 MIN: CPT | Performed by: FAMILY MEDICINE

## 2018-03-21 RX ORDER — DOXEPIN HYDROCHLORIDE 25 MG/1
25 CAPSULE ORAL
Qty: 90 CAP | Refills: 1 | Status: SHIPPED | OUTPATIENT
Start: 2018-03-21 | End: 2018-03-27 | Stop reason: SDUPTHER

## 2018-03-21 ASSESSMENT — PATIENT HEALTH QUESTIONNAIRE - PHQ9
6. FEELING BAD ABOUT YOURSELF - OR THAT YOU ARE A FAILURE OR HAVE LET YOURSELF OR YOUR FAMILY DOWN: 1
2. FEELING DOWN, DEPRESSED, IRRITABLE, OR HOPELESS: 2
SUM OF ALL RESPONSES TO PHQ QUESTIONS 1-9: 16
1. LITTLE INTEREST OR PLEASURE IN DOING THINGS: 2
3. TROUBLE FALLING OR STAYING ASLEEP OR SLEEPING TOO MUCH: 0
4. FEELING TIRED OR HAVING LITTLE ENERGY: 2
7. TROUBLE CONCENTRATING ON THINGS, SUCH AS READING THE NEWSPAPER OR WATCHING TELEVISION: 0
CLINICAL INTERPRETATION OF PHQ2 SCORE: 4
5. POOR APPETITE OR OVEREATING: 2 - MORE THAN HALF THE DAYS
SUM OF ALL RESPONSES TO PHQ QUESTIONS 1-9: 8
SUM OF ALL RESPONSES TO PHQ9 QUESTIONS 1 AND 2: 4
8. MOVING OR SPEAKING SO SLOWLY THAT OTHER PEOPLE COULD HAVE NOTICED. OR THE OPPOSITE, BEING SO FIGETY OR RESTLESS THAT YOU HAVE BEEN MOVING AROUND A LOT MORE THAN USUAL: 1
5. POOR APPETITE OR OVEREATING: 0
9. THOUGHTS THAT YOU WOULD BE BETTER OFF DEAD, OR OF HURTING YOURSELF: 0

## 2018-03-21 NOTE — PROGRESS NOTES
Patient comes in. She still feeling depressed. She is tolerating doxepin 10 mg daily at bedtime without problems. She has no chest pains or shortness of breath. She has continued to lose weight.  She doesn't like boost or an sure. I want her to try [employer] [employer] Breakfast with ice cream and make a milkshake and have those every day. She said she is willing to do that.  She is seeing her  for counseling and that's helpful.    I reviewed the following    Past Medical History:   Diagnosis Date   • Arrhythmia    • Bronchitis    • Depression    • Fall    • Hypertension    • Indigestion    • Renal disorder     stones   • Stroke (CMS-HCC)     TIAs and CVA   • UTI (lower urinary tract infection) 7/18/2013        Past Surgical History:   Procedure Laterality Date   • EYE SURGERY  2007    bilateral cataracts   • COLONOSCOPY  2004    normal   • ABDOMINAL HYSTERECTOMY TOTAL  1974    benign disease   • CHOLECYSTECTOMY  1960   • LAPAROTOMY  1957    partial oophorectomy--benign disease       Allergies   Allergen Reactions   • Bupropion    • Cymbalta [Duloxetine Hcl] Nausea   • Darvocet [Propoxyphene N-Apap]      Dizziness     • Fluoxetine      suicidal thoughts      • Iodine    • Lexapro    • Lipitor [Atorvastatin Calcium]      muscle aches         Current Outpatient Prescriptions   Medication Sig Dispense Refill   • doxepin (SINEQUAN) 25 MG Cap Take 1 Cap by mouth every bedtime. 90 Cap 1   • simvastatin (ZOCOR) 40 MG Tab TAKE 1 TABLET BY MOUTH EVERY EVENING 90 Tab 3   • benazepril (LOTENSIN) 5 MG Tab TAKE 1 TABLET BY MOUTH TWICE A  Tab 3   • GABAPENTIN PO Take  by mouth.     • vitamin D (CHOLECALCIFEROL) 1000 UNIT Tab Take 3 Tabs by mouth every day. 30 Tab 3   • aspirin (ASA) 325 MG TABS Take 81 mg by mouth every day.     • acetaminophen/caffeine/butalbital 325-40-50 mg (FIORICET) -40 MG Tab TAKE 1/2 TO 1 TABLET ORALLY EVERY 4 HOURS AS NEEDED FOR HEADACHE (Patient not taking: Reported on 9/12/2017) 50 Tab 1      No current facility-administered medications for this visit.         Family History   Problem Relation Age of Onset   • Cancer Mother    • Heart Disease Mother    • Arthritis Mother      Lupus   • Heart Disease Father    • Cancer Brother    • Lung Disease Sister       from Lung Cancer at 70       Social History     Social History   • Marital status:      Spouse name: N/A   • Number of children: N/A   • Years of education: N/A     Occupational History   • Not on file.     Social History Main Topics   • Smoking status: Never Smoker   • Smokeless tobacco: Never Used   • Alcohol use Yes      Comment: rare   • Drug use: No   • Sexual activity: Yes     Partners: Male     Birth control/ protection: Post-Menopausal     Other Topics Concern   • Not on file     Social History Narrative   • No narrative on file      The patient is well-developed well-nourished and in no acute distress    Pupils equally round and reactive to light    Ears normal    Nose normal    Throat clear    Neck supple no cervical adenopathy no thyromegaly    Chest clear to auscultation    Heart regular rhythm no murmur S3 or S4 appreciated    Back no CVA pain or spasm    Abdomen flat soft good bowel sounds no mass No hepatosplenomegaly no rebound    Skin no rashes or suspicious lesions    DTRs 2+ in ankles knees and biceps    Babinski downgoing bilaterally    Pulses 2+ in all 4 extremities      Depression Screen (PHQ-2/PHQ-9) 2018 3/21/2018 3/21/2018   PHQ-2 Total Score 3 - 4   PHQ-2 Total Score - - -   PHQ-2 Total Score - 4 -   PHQ-9 Total Score 7 - 8   PHQ-9 Total Score - 16 -       Interpretation of PHQ-9 Total Score   Score Severity   1-4 Minimal Depression   5-9 Mild Depression   10-14 Moderate Depression   15-19 Moderately Severe Depression   20-27 Severe Depression      1. Cerebrovascular accident (CVA) due to bilateral thrombosis of posterior cerebral arteries (CMS-HCC)   Stable    2. Grief  doxepin (SINEQUAN) 25 MG  Cap--increased to this dose one by mouth daily at bedtime     Patient has been identified as being depressed and appropriate orders and counseling have been given   3. Depression, unspecified depression type  doxepin (SINEQUAN) 25 MG Cap    Patient has been identified as being depressed and appropriate orders and counseling have been given   4. Gastroesophageal reflux disease with esophagitis   Quiescent    5. Moderate single current episode of major depressive disorder (CMS-HCC)  doxepin (SINEQUAN) 25 MG Cap    Patient has been identified as being depressed and appropriate orders and counseling have been given     Recheck with me 6 weeks or when necessary    Please note that this dictation was created using voice recognition software. I have worked with consultants from the vendor as well as technical experts from [facility] to optimize the interface. I have made every reasonable attempt to correct obvious errors, but I expect that there are errors of grammar and possibly content that I did not discover before finalizing the note.

## 2018-03-27 ENCOUNTER — TELEPHONE (OUTPATIENT)
Dept: MEDICAL GROUP | Facility: PHYSICIAN GROUP | Age: 83
End: 2018-03-27

## 2018-03-27 DIAGNOSIS — F32.1 MODERATE SINGLE CURRENT EPISODE OF MAJOR DEPRESSIVE DISORDER (HCC): ICD-10-CM

## 2018-03-27 DIAGNOSIS — F32.A DEPRESSION, UNSPECIFIED DEPRESSION TYPE: ICD-10-CM

## 2018-03-27 DIAGNOSIS — F43.21 GRIEF: ICD-10-CM

## 2018-03-27 RX ORDER — DOXEPIN HYDROCHLORIDE 10 MG/1
10 CAPSULE ORAL
Qty: 60 CAP | Refills: 1 | Status: SHIPPED | OUTPATIENT
Start: 2018-03-27 | End: 2018-10-05

## 2018-03-27 NOTE — TELEPHONE ENCOUNTER
VOICEMAIL  1. Caller Name: Alix A Clauson                        Call Back Number: [phone] (home)       2. Message: pt is calling regarding the increase in her    doxepin (SINEQUAN) 25 MG Cap--      she states it is too much. She has passed over the weekend and hit her back she thinks the dose is to high.   Pt is leaving town Friday and wants to feel better by then     Please advise.         3. Patient approves office to leave a detailed voicemail/MyChart message: N\A

## 2018-03-27 NOTE — TELEPHONE ENCOUNTER
Go back to 10 mg at bedtime. I sent a new prescription for the 10 mg strength to [facility] for her. Don't take anymore of the 25 mg strength.Yoav Wakefield MD

## 2018-05-25 NOTE — TELEPHONE ENCOUNTER
I have offered this to the patient inthe past and she has refused.  Will bring this up with her the next time I see her in the Clinic.    Yoav Wakefield M.D.    
Pt does not have an appt until 11/15/17. She states she has had a couple of really bad weeks. Do you want to wait?   
Pt scheduled for 11am today   
VOICEMAIL  1. Caller Name: Alix A Clauson                        Call Back Number: [phone] (home)       2. Message: pt is calling stating she just saw Dr Jensen and and he thinks pt should be put on some medications for Depression. Please advise     3. Patient approves office to leave a detailed voicemail/MyChart message: N\A    
deficits as listed below/Right LE ROM was WFL (within functional limits)/Right UE ROM was WFL (within functional limits)/dec PROM L hand/shldr; L hip ext lacking ~15 degrees from neutral

## 2018-05-30 ENCOUNTER — HOSPITAL ENCOUNTER (OUTPATIENT)
Dept: LAB | Facility: MEDICAL CENTER | Age: 83
End: 2018-05-30
Attending: FAMILY MEDICINE
Payer: MEDICARE

## 2018-05-30 ENCOUNTER — OFFICE VISIT (OUTPATIENT)
Dept: MEDICAL GROUP | Facility: PHYSICIAN GROUP | Age: 83
End: 2018-05-30
Payer: MEDICARE

## 2018-05-30 VITALS
WEIGHT: 131 LBS | SYSTOLIC BLOOD PRESSURE: 100 MMHG | HEIGHT: 59 IN | BODY MASS INDEX: 26.41 KG/M2 | TEMPERATURE: 97 F | RESPIRATION RATE: 12 BRPM | HEART RATE: 66 BPM | OXYGEN SATURATION: 94 % | DIASTOLIC BLOOD PRESSURE: 72 MMHG

## 2018-05-30 DIAGNOSIS — R47.02 EXPRESSIVE DYSPHASIA: ICD-10-CM

## 2018-05-30 DIAGNOSIS — I10 ESSENTIAL HYPERTENSION: ICD-10-CM

## 2018-05-30 DIAGNOSIS — F43.21 GRIEF: ICD-10-CM

## 2018-05-30 DIAGNOSIS — I63.333 CEREBROVASCULAR ACCIDENT (CVA) DUE TO BILATERAL THROMBOSIS OF POSTERIOR CEREBRAL ARTERIES (HCC): ICD-10-CM

## 2018-05-30 LAB
ALBUMIN SERPL BCP-MCNC: 4.2 G/DL (ref 3.2–4.9)
ALBUMIN/GLOB SERPL: 1.6 G/DL
ALP SERPL-CCNC: 58 U/L (ref 30–99)
ALT SERPL-CCNC: 20 U/L (ref 2–50)
ANION GAP SERPL CALC-SCNC: 8 MMOL/L (ref 0–11.9)
AST SERPL-CCNC: 35 U/L (ref 12–45)
BILIRUB SERPL-MCNC: 1 MG/DL (ref 0.1–1.5)
BUN SERPL-MCNC: 12 MG/DL (ref 8–22)
CALCIUM SERPL-MCNC: 9.7 MG/DL (ref 8.5–10.5)
CHLORIDE SERPL-SCNC: 104 MMOL/L (ref 96–112)
CHOLEST SERPL-MCNC: 127 MG/DL (ref 100–199)
CO2 SERPL-SCNC: 32 MMOL/L (ref 20–33)
CREAT SERPL-MCNC: 0.99 MG/DL (ref 0.5–1.4)
GLOBULIN SER CALC-MCNC: 2.7 G/DL (ref 1.9–3.5)
GLUCOSE SERPL-MCNC: 86 MG/DL (ref 65–99)
HDLC SERPL-MCNC: 58 MG/DL
LDLC SERPL CALC-MCNC: 51 MG/DL
POTASSIUM SERPL-SCNC: 3.3 MMOL/L (ref 3.6–5.5)
PROT SERPL-MCNC: 6.9 G/DL (ref 6–8.2)
SODIUM SERPL-SCNC: 144 MMOL/L (ref 135–145)
TRIGL SERPL-MCNC: 90 MG/DL (ref 0–149)

## 2018-05-30 PROCEDURE — 80053 COMPREHEN METABOLIC PANEL: CPT

## 2018-05-30 PROCEDURE — 80061 LIPID PANEL: CPT

## 2018-05-30 PROCEDURE — 99214 OFFICE O/P EST MOD 30 MIN: CPT | Performed by: FAMILY MEDICINE

## 2018-05-30 PROCEDURE — 36415 COLL VENOUS BLD VENIPUNCTURE: CPT

## 2018-05-31 ENCOUNTER — TELEPHONE (OUTPATIENT)
Dept: MEDICAL GROUP | Facility: PHYSICIAN GROUP | Age: 83
End: 2018-05-31

## 2018-05-31 NOTE — PROGRESS NOTES
Patient comes in.  She is on doxepin 10 mg nightly and says that is working reasonably well for her depression.  She is still grieving the loss of her .  She is going to visit a good friend in [facility] for the next week or so.  She does have trouble getting words out when she thinks of them.  She would like to go back to see Dr. Jensen her neurologist again.  I will do a referral for her.  I want to get a repeat conference of metabolic panel and lipid profile as well.    I reviewed the following    Past Medical History:   Diagnosis Date   • Arrhythmia    • Bronchitis    • Depression    • Fall    • Hypertension    • Indigestion    • Renal disorder     stones   • Stroke (HCC)     TIAs and CVA   • UTI (lower urinary tract infection) 7/18/2013        Past Surgical History:   Procedure Laterality Date   • EYE SURGERY  2007    bilateral cataracts   • COLONOSCOPY  2004    normal   • ABDOMINAL HYSTERECTOMY TOTAL  1974    benign disease   • CHOLECYSTECTOMY  1960   • LAPAROTOMY  1957    partial oophorectomy--benign disease       Allergies   Allergen Reactions   • Bupropion    • Cymbalta [Duloxetine Hcl] Nausea   • Darvocet [Propoxyphene N-Apap]      Dizziness     • Fluoxetine      suicidal thoughts      • Iodine    • Lexapro    • Lipitor [Atorvastatin Calcium]      muscle aches         Current Outpatient Prescriptions   Medication Sig Dispense Refill   • doxepin (SINEQUAN) 10 MG Cap TAKE 1 CAP BY MOUTH EVERY BEDTIME. 90 Cap 1   • simvastatin (ZOCOR) 40 MG Tab TAKE 1 TABLET BY MOUTH EVERY EVENING 90 Tab 3   • benazepril (LOTENSIN) 5 MG Tab TAKE 1 TABLET BY MOUTH TWICE A  Tab 3   • GABAPENTIN PO Take  by mouth.     • vitamin D (CHOLECALCIFEROL) 1000 UNIT Tab Take 3 Tabs by mouth every day. 30 Tab 3   • aspirin (ASA) 325 MG TABS Take 81 mg by mouth every day.     • doxepin (SINEQUAN) 10 MG Cap Take 1 Cap by mouth every bedtime. 60 Cap 1   • acetaminophen/caffeine/butalbital 325-40-50 mg (FIORICET)  -40 MG Tab TAKE 1/2 TO 1 TABLET ORALLY EVERY 4 HOURS AS NEEDED FOR HEADACHE (Patient not taking: Reported on 2017) 50 Tab 1     No current facility-administered medications for this visit.         Family History   Problem Relation Age of Onset   • Cancer Mother    • Heart Disease Mother    • Arthritis Mother      Lupus   • Heart Disease Father    • Cancer Brother    • Lung Disease Sister       from Lung Cancer at 70       Social History     Social History   • Marital status:      Spouse name: N/A   • Number of children: N/A   • Years of education: N/A     Occupational History   • Not on file.     Social History Main Topics   • Smoking status: Never Smoker   • Smokeless tobacco: Never Used   • Alcohol use Yes      Comment: rare   • Drug use: No   • Sexual activity: Yes     Partners: Male     Birth control/ protection: Post-Menopausal     Other Topics Concern   • Not on file     Social History Narrative   • No narrative on file      Physical Exam   Constitutional: She is oriented. She appears well-developed and well-nourished. No distress.  She does have some expressive dysphasia  HENT:  Head: Normocephalic and atraumatic.   Right Ear: External ear normal. Ear canal and TM normal   Left Ear: External ear normal. Ear canal and TM normal  Nose: Nose normal.   Mouth/Throat: Oropharynx is clear and moist.   Eyes: Conjunctivae and extraocular motions are normal. Pupils are equal, round, and reactive to light. Fundi benign bilaterally   Neck: No thyromegaly present.   Cardiovascular: Normal rate, regular rhythm, normal heart sounds and intact distal pulses.  Exam reveals no gallop.    No murmur heard.  Pulmonary/Chest: Effort normal and breath sounds normal. No respiratory distress. She has no wheezes. She has no rales.   Abdominal: Soft. Bowel sounds are normal. No hepatosplenomegaly She exhibits no distension. No tenderness. She has no rebound and no guarding.   Musculoskeletal: Normal range of  motion. She exhibits no edema and no tenderness.   Lymphadenopathy:     She has no cervical adenopathy.   No supraclavicular adenopathy  Neurological: She is alert and oriented. She has normal reflexes.        Babinskis downgoing bilaterally   Skin: Skin is warm and dry. No rash noted. No erythema.   Psychiatric: She has a normal mood and appropriate affect. Her behavior is normal. Judgment and thought content normal.    1. Cerebrovascular accident (CVA) due to bilateral thrombosis of posterior cerebral arteries (HCC)  REFERRAL TO NEUROLOGY--Dr. Jensen   2. Grief   patient is advised to continue counseling with her  and also I am glad she is going to see her friend in [facility].   3. Essential hypertension --controlled COMP METABOLIC PANEL    LIPID PROFILE   4. Expressive dysphasia  REFERRAL TO NEUROLOGY--Dr. Otero is     Recheck with me 1 month or as needed    Please note that this dictation was created using voice recognition software. I have worked with consultants from the vendor as well as technical experts from [facility] to optimize the interface. I have made every reasonable attempt to correct obvious errors, but I expect that there are errors of grammar and possibly content that I did not discover before finalizing the note.

## 2018-05-31 NOTE — TELEPHONE ENCOUNTER
----- Message from Rodríguez Atkins M.D. sent at 5/31/2018  3:45 PM PDT -----  Please inform patient that her labs overall are stable/normal except slightly low potassium level.Please advise to take potassium rich foods like banana,apricots, squash, kidney beans, spinach, sweet patotoes etc.  Kidney function is stable.Rest of the labs are normal.    Rodríguez Atkins M.D.  Covering for

## 2018-05-31 NOTE — LETTER
May 31, 2018         Alix Rush  [address] [address]        Dear Alix:      Below are the results from your recent visit:    Please inform patient that her labs overall are stable/normal except slightly low potassium level.Please advise to take potassium rich foods like banana,apricots, squash, kidney beans, spinach, sweet patotoes etc.   Kidney function is stable.Rest of the labs are normal.     Rodríguez Atkins M.D.   Covering for      Resulted Orders   COMP METABOLIC PANEL   Result Value Ref Range    Sodium 144 135 - 145 mmol/L    Potassium 3.3 (L) 3.6 - 5.5 mmol/L    Chloride 104 96 - 112 mmol/L    Co2 32 20 - 33 mmol/L    Anion Gap 8.0 0.0 - 11.9    Glucose 86 65 - 99 mg/dL    Bun 12 8 - 22 mg/dL    Creatinine 0.99 0.50 - 1.40 mg/dL    Calcium 9.7 8.5 - 10.5 mg/dL    AST(SGOT) 35 12 - 45 U/L    ALT(SGPT) 20 2 - 50 U/L    Alkaline Phosphatase 58 30 - 99 U/L    Total Bilirubin 1.0 0.1 - 1.5 mg/dL    Albumin 4.2 3.2 - 4.9 g/dL    Total Protein 6.9 6.0 - 8.2 g/dL    Globulin 2.7 1.9 - 3.5 g/dL    A-G Ratio 1.6 g/dL    Narrative    Request patient fasting?->Yes   LIPID PROFILE   Result Value Ref Range    Cholesterol,Tot 127 100 - 199 mg/dL    Triglycerides 90 0 - 149 mg/dL    HDL 58 >=40 mg/dL    LDL 51 <100 mg/dL    Narrative    Request patient fasting?->Yes   ESTIMATED GFR   Result Value Ref Range    GFR If African American >60 >60 mL/min/1.73 m 2    GFR If Non African American 54 (A) >60 mL/min/1.73 m 2    Narrative    Request patient fasting?->Yes     If you have any questions or concerns, please don't hesitate to call.    Electronically Signed

## 2018-06-04 ENCOUNTER — TELEPHONE (OUTPATIENT)
Dept: MEDICAL GROUP | Facility: PHYSICIAN GROUP | Age: 83
End: 2018-06-04

## 2018-06-04 NOTE — TELEPHONE ENCOUNTER
Pt's daughter in law  aware that Dr Wakefield is not in the office until Wednesday and that he would address then

## 2018-06-04 NOTE — TELEPHONE ENCOUNTER
Please let patient know that  is away on vacation until tomorrow.When he is back in office, he will be able to address her concerns.In the meantime, if she is any acute or worsening signs and symptoms that require immediate attention, then she should be seen in clinic.    Rodríguez Atkins M.D.

## 2018-06-04 NOTE — TELEPHONE ENCOUNTER
VOICEMAIL  1. Caller Name: Emerita Clauson                      Call Back Number: [phone]    2. Message: pt's daughter in law is calling to see if you could call her at some point, the family is getting very concerned about pt's forgetfulness and her driving. They do not think she is being completley honest with you at her appointments.         3. Patient approves office to leave a detailed voicemail/MyChart message: N\A

## 2018-06-06 NOTE — TELEPHONE ENCOUNTER
Tired to call Emerita back was told I dialed the wrong number. I verified the number with person that answered. Do not have another number to reach her

## 2018-06-06 NOTE — TELEPHONE ENCOUNTER
I am aware that she is having more issues with her memory and I have already done a referral back to Dr. Jensen her neurologist about this.Yoav Wakefield MD

## 2018-09-26 DIAGNOSIS — F43.21 GRIEF: ICD-10-CM

## 2018-09-26 DIAGNOSIS — F32.1 MODERATE SINGLE CURRENT EPISODE OF MAJOR DEPRESSIVE DISORDER (HCC): ICD-10-CM

## 2018-09-26 DIAGNOSIS — I10 ESSENTIAL HYPERTENSION: ICD-10-CM

## 2018-09-26 RX ORDER — DOXEPIN HYDROCHLORIDE 10 MG/1
10 CAPSULE ORAL
Qty: 90 CAP | Refills: 1 | Status: SHIPPED | OUTPATIENT
Start: 2018-09-26 | End: 2019-02-19 | Stop reason: SDUPTHER

## 2018-09-26 RX ORDER — BENAZEPRIL HYDROCHLORIDE 5 MG/1
TABLET ORAL
Qty: 180 TAB | Refills: 1 | Status: SHIPPED | OUTPATIENT
Start: 2018-09-26 | End: 2019-02-19 | Stop reason: SDUPTHER

## 2018-10-05 ENCOUNTER — APPOINTMENT (OUTPATIENT)
Dept: RADIOLOGY | Facility: MEDICAL CENTER | Age: 83
End: 2018-10-05
Attending: INTERNAL MEDICINE
Payer: MEDICARE

## 2018-10-05 ENCOUNTER — APPOINTMENT (OUTPATIENT)
Dept: RADIOLOGY | Facility: MEDICAL CENTER | Age: 83
End: 2018-10-05
Attending: EMERGENCY MEDICINE
Payer: MEDICARE

## 2018-10-05 ENCOUNTER — HOSPITAL ENCOUNTER (OUTPATIENT)
Facility: MEDICAL CENTER | Age: 83
End: 2018-10-06
Attending: EMERGENCY MEDICINE | Admitting: INTERNAL MEDICINE
Payer: MEDICARE

## 2018-10-05 DIAGNOSIS — I63.333 CEREBROVASCULAR ACCIDENT (CVA) DUE TO BILATERAL THROMBOSIS OF POSTERIOR CEREBRAL ARTERIES (HCC): ICD-10-CM

## 2018-10-05 DIAGNOSIS — R55 SYNCOPE, UNSPECIFIED SYNCOPE TYPE: ICD-10-CM

## 2018-10-05 DIAGNOSIS — N30.00 ACUTE CYSTITIS WITHOUT HEMATURIA: ICD-10-CM

## 2018-10-05 LAB
ALBUMIN SERPL BCP-MCNC: 3.6 G/DL (ref 3.2–4.9)
ALBUMIN/GLOB SERPL: 1.2 G/DL
ALP SERPL-CCNC: 59 U/L (ref 30–99)
ALT SERPL-CCNC: 19 U/L (ref 2–50)
ANION GAP SERPL CALC-SCNC: 5 MMOL/L (ref 0–11.9)
APPEARANCE UR: CLEAR
AST SERPL-CCNC: 26 U/L (ref 12–45)
BACTERIA #/AREA URNS HPF: ABNORMAL /HPF
BASOPHILS # BLD AUTO: 0.3 % (ref 0–1.8)
BASOPHILS # BLD: 0.02 K/UL (ref 0–0.12)
BILIRUB SERPL-MCNC: 1.6 MG/DL (ref 0.1–1.5)
BILIRUB UR QL STRIP.AUTO: NEGATIVE
BNP SERPL-MCNC: 142 PG/ML (ref 0–100)
BUN SERPL-MCNC: 17 MG/DL (ref 8–22)
CALCIUM SERPL-MCNC: 9.3 MG/DL (ref 8.5–10.5)
CHLORIDE SERPL-SCNC: 110 MMOL/L (ref 96–112)
CO2 SERPL-SCNC: 28 MMOL/L (ref 20–33)
COLOR UR: YELLOW
CREAT SERPL-MCNC: 0.95 MG/DL (ref 0.5–1.4)
EKG IMPRESSION: NORMAL
EKG IMPRESSION: NORMAL
EOSINOPHIL # BLD AUTO: 0.18 K/UL (ref 0–0.51)
EOSINOPHIL NFR BLD: 3.1 % (ref 0–6.9)
EPI CELLS #/AREA URNS HPF: NEGATIVE /HPF
ERYTHROCYTE [DISTWIDTH] IN BLOOD BY AUTOMATED COUNT: 49.7 FL (ref 35.9–50)
EST. AVERAGE GLUCOSE BLD GHB EST-MCNC: 108 MG/DL
GLOBULIN SER CALC-MCNC: 2.9 G/DL (ref 1.9–3.5)
GLUCOSE SERPL-MCNC: 115 MG/DL (ref 65–99)
GLUCOSE UR STRIP.AUTO-MCNC: NEGATIVE MG/DL
HBA1C MFR BLD: 5.4 % (ref 0–5.6)
HCT VFR BLD AUTO: 41 % (ref 37–47)
HGB BLD-MCNC: 13.3 G/DL (ref 12–16)
HYALINE CASTS #/AREA URNS LPF: ABNORMAL /LPF
IMM GRANULOCYTES # BLD AUTO: 0.02 K/UL (ref 0–0.11)
IMM GRANULOCYTES NFR BLD AUTO: 0.3 % (ref 0–0.9)
KETONES UR STRIP.AUTO-MCNC: NEGATIVE MG/DL
LEUKOCYTE ESTERASE UR QL STRIP.AUTO: ABNORMAL
LYMPHOCYTES # BLD AUTO: 0.61 K/UL (ref 1–4.8)
LYMPHOCYTES NFR BLD: 10.5 % (ref 22–41)
MCH RBC QN AUTO: 32 PG (ref 27–33)
MCHC RBC AUTO-ENTMCNC: 32.4 G/DL (ref 33.6–35)
MCV RBC AUTO: 98.8 FL (ref 81.4–97.8)
MICRO URNS: ABNORMAL
MONOCYTES # BLD AUTO: 0.33 K/UL (ref 0–0.85)
MONOCYTES NFR BLD AUTO: 5.7 % (ref 0–13.4)
NEUTROPHILS # BLD AUTO: 4.64 K/UL (ref 2–7.15)
NEUTROPHILS NFR BLD: 80.1 % (ref 44–72)
NITRITE UR QL STRIP.AUTO: NEGATIVE
NRBC # BLD AUTO: 0 K/UL
NRBC BLD-RTO: 0 /100 WBC
PH UR STRIP.AUTO: 7 [PH]
PLATELET # BLD AUTO: 112 K/UL (ref 164–446)
PMV BLD AUTO: 10.8 FL (ref 9–12.9)
POTASSIUM SERPL-SCNC: 3.9 MMOL/L (ref 3.6–5.5)
PROT SERPL-MCNC: 6.5 G/DL (ref 6–8.2)
PROT UR QL STRIP: NEGATIVE MG/DL
RBC # BLD AUTO: 4.15 M/UL (ref 4.2–5.4)
RBC # URNS HPF: ABNORMAL /HPF
RBC UR QL AUTO: NEGATIVE
SODIUM SERPL-SCNC: 143 MMOL/L (ref 135–145)
SP GR UR STRIP.AUTO: 1.02
TROPONIN I SERPL-MCNC: <0.01 NG/ML (ref 0–0.04)
UROBILINOGEN UR STRIP.AUTO-MCNC: 1 MG/DL
WBC # BLD AUTO: 5.8 K/UL (ref 4.8–10.8)
WBC #/AREA URNS HPF: ABNORMAL /HPF

## 2018-10-05 PROCEDURE — 99204 OFFICE O/P NEW MOD 45 MIN: CPT | Mod: GC | Performed by: INTERNAL MEDICINE

## 2018-10-05 PROCEDURE — 94760 N-INVAS EAR/PLS OXIMETRY 1: CPT

## 2018-10-05 PROCEDURE — G0378 HOSPITAL OBSERVATION PER HR: HCPCS

## 2018-10-05 PROCEDURE — 81001 URINALYSIS AUTO W/SCOPE: CPT

## 2018-10-05 PROCEDURE — 85025 COMPLETE CBC W/AUTO DIFF WBC: CPT

## 2018-10-05 PROCEDURE — 700105 HCHG RX REV CODE 258: Performed by: INTERNAL MEDICINE

## 2018-10-05 PROCEDURE — 93005 ELECTROCARDIOGRAM TRACING: CPT

## 2018-10-05 PROCEDURE — 80053 COMPREHEN METABOLIC PANEL: CPT

## 2018-10-05 PROCEDURE — 96365 THER/PROPH/DIAG IV INF INIT: CPT

## 2018-10-05 PROCEDURE — 36415 COLL VENOUS BLD VENIPUNCTURE: CPT

## 2018-10-05 PROCEDURE — 700105 HCHG RX REV CODE 258: Performed by: EMERGENCY MEDICINE

## 2018-10-05 PROCEDURE — A9270 NON-COVERED ITEM OR SERVICE: HCPCS | Performed by: INTERNAL MEDICINE

## 2018-10-05 PROCEDURE — 93005 ELECTROCARDIOGRAM TRACING: CPT | Performed by: EMERGENCY MEDICINE

## 2018-10-05 PROCEDURE — 93010 ELECTROCARDIOGRAM REPORT: CPT | Performed by: INTERNAL MEDICINE

## 2018-10-05 PROCEDURE — 700102 HCHG RX REV CODE 250 W/ 637 OVERRIDE(OP): Performed by: INTERNAL MEDICINE

## 2018-10-05 PROCEDURE — 93005 ELECTROCARDIOGRAM TRACING: CPT | Performed by: INTERNAL MEDICINE

## 2018-10-05 PROCEDURE — 83036 HEMOGLOBIN GLYCOSYLATED A1C: CPT

## 2018-10-05 PROCEDURE — 99285 EMERGENCY DEPT VISIT HI MDM: CPT

## 2018-10-05 PROCEDURE — 99220 PR INITIAL OBSERVATION CARE,LEVL III: CPT | Performed by: INTERNAL MEDICINE

## 2018-10-05 PROCEDURE — 84484 ASSAY OF TROPONIN QUANT: CPT

## 2018-10-05 PROCEDURE — 83880 ASSAY OF NATRIURETIC PEPTIDE: CPT

## 2018-10-05 PROCEDURE — 70450 CT HEAD/BRAIN W/O DYE: CPT

## 2018-10-05 PROCEDURE — 93010 ELECTROCARDIOGRAM REPORT: CPT | Mod: 77 | Performed by: INTERNAL MEDICINE

## 2018-10-05 PROCEDURE — 700111 HCHG RX REV CODE 636 W/ 250 OVERRIDE (IP): Performed by: EMERGENCY MEDICINE

## 2018-10-05 PROCEDURE — 71045 X-RAY EXAM CHEST 1 VIEW: CPT

## 2018-10-05 RX ORDER — BENAZEPRIL HYDROCHLORIDE 20 MG/1
5 TABLET ORAL
Status: DISCONTINUED | OUTPATIENT
Start: 2018-10-05 | End: 2018-10-05

## 2018-10-05 RX ORDER — SODIUM CHLORIDE 9 MG/ML
INJECTION, SOLUTION INTRAVENOUS CONTINUOUS
Status: DISCONTINUED | OUTPATIENT
Start: 2018-10-05 | End: 2018-10-06 | Stop reason: HOSPADM

## 2018-10-05 RX ORDER — POLYETHYLENE GLYCOL 3350 17 G/17G
1 POWDER, FOR SOLUTION ORAL
Status: DISCONTINUED | OUTPATIENT
Start: 2018-10-05 | End: 2018-10-06 | Stop reason: HOSPADM

## 2018-10-05 RX ORDER — AMOXICILLIN 250 MG
2 CAPSULE ORAL 2 TIMES DAILY
Status: DISCONTINUED | OUTPATIENT
Start: 2018-10-05 | End: 2018-10-06 | Stop reason: HOSPADM

## 2018-10-05 RX ORDER — DOXEPIN HYDROCHLORIDE 10 MG/1
10 CAPSULE ORAL
Status: DISCONTINUED | OUTPATIENT
Start: 2018-10-05 | End: 2018-10-06 | Stop reason: HOSPADM

## 2018-10-05 RX ORDER — BISACODYL 10 MG
10 SUPPOSITORY, RECTAL RECTAL
Status: DISCONTINUED | OUTPATIENT
Start: 2018-10-05 | End: 2018-10-06 | Stop reason: HOSPADM

## 2018-10-05 RX ORDER — SIMVASTATIN 20 MG
40 TABLET ORAL EVERY EVENING
Status: DISCONTINUED | OUTPATIENT
Start: 2018-10-05 | End: 2018-10-06 | Stop reason: HOSPADM

## 2018-10-05 RX ADMIN — SODIUM CHLORIDE: 9 INJECTION, SOLUTION INTRAVENOUS at 15:17

## 2018-10-05 RX ADMIN — CEFTRIAXONE SODIUM 1 G: 1 INJECTION, POWDER, FOR SOLUTION INTRAMUSCULAR; INTRAVENOUS at 11:29

## 2018-10-05 RX ADMIN — SIMVASTATIN 40 MG: 20 TABLET, FILM COATED ORAL at 16:39

## 2018-10-05 RX ADMIN — VITAMIN D, TAB 1000IU (100/BT) 1000 UNITS: 25 TAB at 15:22

## 2018-10-05 RX ADMIN — DOXEPIN HYDROCHLORIDE 10 MG: 10 CAPSULE ORAL at 23:52

## 2018-10-05 RX ADMIN — ASPIRIN 81 MG: 81 TABLET, COATED ORAL at 16:39

## 2018-10-05 ASSESSMENT — COGNITIVE AND FUNCTIONAL STATUS - GENERAL
PERSONAL GROOMING: A LITTLE
DRESSING REGULAR LOWER BODY CLOTHING: A LITTLE
STANDING UP FROM CHAIR USING ARMS: A LITTLE
MOVING TO AND FROM BED TO CHAIR: A LITTLE
MOBILITY SCORE: 18
SUGGESTED CMS G CODE MODIFIER DAILY ACTIVITY: CK
TOILETING: A LITTLE
WALKING IN HOSPITAL ROOM: A LITTLE
WALKING IN HOSPITAL ROOM: A LITTLE
EATING MEALS: A LITTLE
MOVING FROM LYING ON BACK TO SITTING ON SIDE OF FLAT BED: A LITTLE
DRESSING REGULAR UPPER BODY CLOTHING: A LITTLE
STANDING UP FROM CHAIR USING ARMS: A LITTLE
MOVING FROM LYING ON BACK TO SITTING ON SIDE OF FLAT BED: A LITTLE
CLIMB 3 TO 5 STEPS WITH RAILING: A LITTLE
TOILETING: A LITTLE
DAILY ACTIVITIY SCORE: 18
TURNING FROM BACK TO SIDE WHILE IN FLAT BAD: A LITTLE
SUGGESTED CMS G CODE MODIFIER MOBILITY: CK
CLIMB 3 TO 5 STEPS WITH RAILING: A LITTLE
TURNING FROM BACK TO SIDE WHILE IN FLAT BAD: A LITTLE
EATING MEALS: A LITTLE
SUGGESTED CMS G CODE MODIFIER MOBILITY: CK
DRESSING REGULAR UPPER BODY CLOTHING: A LITTLE
HELP NEEDED FOR BATHING: A LITTLE
HELP NEEDED FOR BATHING: A LITTLE
MOBILITY SCORE: 18
MOVING TO AND FROM BED TO CHAIR: A LITTLE
PERSONAL GROOMING: A LITTLE

## 2018-10-05 ASSESSMENT — PATIENT HEALTH QUESTIONNAIRE - PHQ9
1. LITTLE INTEREST OR PLEASURE IN DOING THINGS: NOT AT ALL
SUM OF ALL RESPONSES TO PHQ9 QUESTIONS 1 AND 2: 0
2. FEELING DOWN, DEPRESSED, IRRITABLE, OR HOPELESS: NOT AT ALL

## 2018-10-05 ASSESSMENT — ENCOUNTER SYMPTOMS
DIZZINESS: 1
EYES NEGATIVE: 1
CARDIOVASCULAR NEGATIVE: 1
TINGLING: 0
SPEECH CHANGE: 1
SEIZURES: 0
LOSS OF CONSCIOUSNESS: 1
GASTROINTESTINAL NEGATIVE: 1
CONSTITUTIONAL NEGATIVE: 1
RESPIRATORY NEGATIVE: 1
MUSCULOSKELETAL NEGATIVE: 1
HEADACHES: 1
TREMORS: 0
FOCAL WEAKNESS: 0
SENSORY CHANGE: 0
DEPRESSION: 1

## 2018-10-05 ASSESSMENT — LIFESTYLE VARIABLES: ALCOHOL_USE: NO

## 2018-10-05 ASSESSMENT — PAIN SCALES - GENERAL
PAINLEVEL_OUTOF10: 0

## 2018-10-05 NOTE — CARE PLAN
Problem: Communication  Goal: The ability to communicate needs accurately and effectively will improve  Outcome: PROGRESSING AS EXPECTED  Pt able to communicate needs appropriately. Call light within reach.     Problem: Safety  Goal: Will remain free from falls  Outcome: PROGRESSING AS EXPECTED  Pt in bed in low, locked position with belongings within reach. Alarm on. Pt educated to call before getting out of bed.

## 2018-10-05 NOTE — THERAPY
SLP Contact Note:  Attempted to see pt for clinical swallow evaluation. Per RN, pt was given sandwich and liquids in ER and passed bedside RN dysphagia screen on the floor and started on regular/thin liquid diet. RN to check with MD/RN supervisor regarding order to see if it is still warranted.  SLP to follow if needed.

## 2018-10-05 NOTE — CONSULTS
Cardiology Consult    Requested by Dr. Megan ROME    CC: LOC with post ictal confusion and stool incontinence.    HPI: Mrs. Rush is 84 yo lady who brought to [facility] by [facility] after the patient LOC and the daughter call.  PMH is significant for previous H/O stroke, seizure (was on Depakote, was seen by Dr. Jones), HTN, and depression.  Per Pt and medical records, the patient was trying to reach bathroom when she felt something wrong with her legs with dizziness then she lost her consciousness for few seconds, when wake up she was very drowsy and couldn't remember what happen. At the ED she was found stool incontinent.  She mentioned that she has unsteady gait and she is falling multiple times at home and frequently hitting her head at the bed edge.  CT scan head w/o is pending. Cardiologist was consulted because of suspected syncope.      MEDICATIONS:      Current Facility-Administered Medications:   •  aspirin EC (ECOTRIN) tablet 81 mg, 81 mg, Oral, BID, Darlene Grant M.D.  •  doxepin (SINEQUAN) capsule 10 mg, 10 mg, Oral, QHS, Darlene Grant M.D.  •  simvastatin (ZOCOR) tablet 40 mg, 40 mg, Oral, Q EVENING, Darlene Grant M.D.  •  vitamin D (cholecalciferol) tablet 1,000 Units, 1,000 Units, Oral, DAILY, Darlene Grant M.D., 1,000 Units at 10/05/18 1522  •  Pharmacy Consult Request - Allow for Permissive Hypertension, , Other, PRN, Darlene Grant M.D.  •  senna-docusate (PERICOLACE or SENOKOT S) 8.6-50 MG per tablet 2 Tab, 2 Tab, Oral, BID **AND** polyethylene glycol/lytes (MIRALAX) PACKET 1 Packet, 1 Packet, Oral, QDAY PRN **AND** magnesium hydroxide (MILK OF MAGNESIA) suspension 30 mL, 30 mL, Oral, QDAY PRN **AND** bisacodyl (DULCOLAX) suppository 10 mg, 10 mg, Rectal, QDAY PRN, Darlene E Frederiksen, M.D.  •  NS infusion, , Intravenous, Continuous, Darlene Grant M.D., Last Rate: 50 mL/hr at 10/05/18 1514    ALLERGIES:   Ms. Rush  is allergic to bupropion;  cymbalta [duloxetine hcl]; darvocet [propoxyphene n-apap]; fluoxetine; iodine; lexapro; and lipitor [atorvastatin calcium].     SURGERIES:  Ms. Rush   has a past surgical history that includes laparotomy (1957); colonoscopy (2004); eye surgery (2007); cholecystectomy (1960); and abdominal hysterectomy total (1974).     MEDICAL ILLNESSES:  Ms. Rush   has a past medical history of Arrhythmia; Bronchitis; Depression; Fall; Hypertension; Indigestion; Renal disorder; Stroke (HCC); and UTI (lower urinary tract infection) (7/18/2013).     SOCIAL HISTORY:  Ms. Rush   reports that she has never smoked. She has never used smokeless tobacco. She reports that she drinks alcohol. She reports that she does not use drugs.     FAMILY HISTORY:  Ms.'s Rush  family history includes Arthritis in her mother; Cancer in her brother and mother; Heart Disease in her father and mother; Lung Disease in her sister.      ROS:    Constitutional: Patient has had no fevers or chills or fatigue. Patient has has no significant weight change.  Eyes: Patient had no visual changes or vision loss.  ENT: Patient denies any sore throat or allergic rhinitis.  Respiratory: Patient has had no cough or sputum production. Also, no hemoptysis.  Cardiovascular: As noted above.  GI: Patient denies any nausea, vomiting, or diarrhea. Patient has had no hematemesis or melena.  : Patient denies any urinary urgency, frequency, or dysuria. Patient has noted no hematuria.  Orthopedic: Patient has no particular problem with arthritis. Patient is able to walk and exercise without difficulty.  Neurologic: Patient has had no focal numbness or weakness.   Psychiatric: Patient denies any difficulty with anxiety or depression.  Endocrine: Patient denies any history of thyroid disorder or diabetes. Patient denies any heat or cold intolerance. In addition, patient denies any polydipsia or polyuria.  Hematologic: Patient has had no easy bruising or bleeding. Patient  denies any history of anemia.  Skin: Patient denies any unusual rashes or skin lesions.  Allergic/immunologic: Patient denies any difficulty with hayfever or other environmental allergens. Patient denies any food allergies.       PHYSICAL EXAM:    /77   Pulse 68   Temp 36.2 °C (97.2 °F)   Resp 20   Ht 1.524 m (5')   Wt 59 kg (130 lb)   LMP 06/26/1974   SpO2 95%   BMI 25.39 kg/m²      General: Patient is an alert,non-obese,  female, in no acute distress.    Eyes: PERRLA, EOMI, lids and conjunctiva are clear.    ENT: Mouth- good oral hygiene, oral mucosa is normal.    Neck: No thyromegaly, cervical adenopathy, or jugular venous distention is noted.    Chest: The lungs are clear to auscultation and percussion. No rales, wheezes, or rhonchi are noted. Respiratory effort is normal.    Cardiac: PMI not displaced to palpation. S1 and S2 are normal. No S3 or S4 appreciated. No murmurs,clicks or rubs are noted. Carotid, femoral, and pedal pulses are 2+ and symmetrical. No carotid or femoral bruits are appreciated.    Abdomen: Abdomen is soft and nontender, no hepatosplenomegaly or masses are noted. The abdominal  aorta does not appear to be enlarged. The abdominal aorta is non-palpable. No abdominal bruits are appreciated.     Musculoskeletal: There is no significant kyphoscoliosis present. Muscle strength and tone are normal. Gait is normal.    Extremities: No clubbing, cyanosis, or edema is present.    Skin: Inspection and palpation are noncontributory.    Neurologic: No focal neurologic abnormality noted.    Psychiatric: The patient is alert and oriented x3. Mood and affect are appropriate.    Lab Results   Component Value Date/Time    CHOLSTRLTOT 127 05/30/2018 11:58 AM    LDL 51 05/30/2018 11:58 AM    HDL 58 05/30/2018 11:58 AM    TRIGLYCERIDE 90 05/30/2018 11:58 AM       Lab Results   Component Value Date/Time    SODIUM 143 10/05/2018 08:27 AM    POTASSIUM 3.9 10/05/2018 08:27 AM    CHLORIDE 110  10/05/2018 08:27 AM    CO2 28 10/05/2018 08:27 AM    GLUCOSE 115 (H) 10/05/2018 08:27 AM    BUN 17 10/05/2018 08:27 AM    CREATININE 0.95 10/05/2018 08:27 AM    CREATININE 1.2 09/19/2008 08:46 AM     Lab Results   Component Value Date/Time    ALKPHOSPHAT 59 10/05/2018 08:27 AM    ASTSGOT 26 10/05/2018 08:27 AM    ALTSGPT 19 10/05/2018 08:27 AM    TBILIRUBIN 1.6 (H) 10/05/2018 08:27 AM      Lab Results   Component Value Date/Time    BNPBTYPENAT 142 (H) 10/05/2018 08:27 AM       Patient Active Problem List    Diagnosis Date Noted   • Expressive dysphasia 05/30/2018   • Cerebrovascular accident (CVA) due to bilateral thrombosis of posterior cerebral arteries (HCC) 05/23/2017   • Vitamin D deficiency disease 11/11/2016   • Asymptomatic PVCs 06/20/2016   • Grief 05/16/2016   • Meningioma (HCC) 07/30/2015   • JINNY (obstructive sleep apnea) 07/13/2012   • Obesity 07/13/2012   • Anxiety 05/10/2012   • Esophageal stricture 03/19/2012   • Menopause 07/16/2010   • Spinal stenosis, lumbar 10/22/2009   • Hypertension 08/20/2009   • Sarcoidosis 08/20/2009   • Depression 08/20/2009   • Nephrolithiasis 08/20/2009   • Edema 08/20/2009   • GERD (gastroesophageal reflux disease) 08/20/2009        PLAN:    LOC with post ictal confusion and stool incontinence:  - less likely to be cardiac related, no changes in EKG compared to previous one, physical exam was benign, denies chest pain., palpitation or SOB.  - Negative for orthostatic hypotension   - Recommend seen by neurologist to evaluate for seizure, might need EEG, couldn't exclude NPH.  - Recommend controlling her blood pressure  - Recommend monitoring electrolytes and replete accordingly    Thank you for involving us in the care of Mrs. Rush, please do not hesitate to call us for any question or concern.

## 2018-10-05 NOTE — PROGRESS NOTES
Patient arrived to floor. Placed on tele. Assessed and stable. Passed dysphagia screening. Diet order placed.

## 2018-10-05 NOTE — ED NOTES
Rounded on pt, updated on wait status, answered questions, addressed needs, ensured call light within reach. Provided emotional support for pt.

## 2018-10-05 NOTE — ED PROVIDER NOTES
"ED Provider Note    CHIEF COMPLAINT  Chief Complaint   Patient presents with   • Syncope       HPI  Alix Rush is a 83 y.o. female who presents to the emergency department after having a syncopal episode this morning.  While trying to get into the shower today the patient lost consciousness her daughter was helping her and she did not fall to the ground or sustain trauma but did lose consciousness for a brief period of time.  The patient is now awake she can only say that she \"feels weird\" she cannot provide any specific symptoms.  There are no recognized exacerbating or alleviating factors or precipitating events.    REVIEW OF SYSTEMS the patient is confused she has dementia and she is really not able to provide a reliable review of systems    PAST MEDICAL HISTORY  Past Medical History:   Diagnosis Date   • Arrhythmia    • Bronchitis    • Depression    • Fall    • Hypertension    • Indigestion    • Renal disorder     stones   • Stroke (HCC)     TIAs and CVA   • UTI (lower urinary tract infection) 2013       FAMILY HISTORY  Family History   Problem Relation Age of Onset   • Cancer Mother    • Heart Disease Mother    • Arthritis Mother         Lupus   • Heart Disease Father    • Cancer Brother    • Lung Disease Sister          from Lung Cancer at 70       SOCIAL HISTORY  Social History     Social History   • Marital status:      Spouse name: N/A   • Number of children: N/A   • Years of education: N/A     Social History Main Topics   • Smoking status: Never Smoker   • Smokeless tobacco: Never Used   • Alcohol use Yes      Comment: rare   • Drug use: No   • Sexual activity: Yes     Partners: Male     Birth control/ protection: Post-Menopausal     Other Topics Concern   • Not on file     Social History Narrative   • No narrative on file       SURGICAL HISTORY  Past Surgical History:   Procedure Laterality Date   • EYE SURGERY      bilateral cataracts   • COLONOSCOPY      normal   • " ABDOMINAL HYSTERECTOMY TOTAL  1974    benign disease   • CHOLECYSTECTOMY  1960   • LAPAROTOMY  1957    partial oophorectomy--benign disease       CURRENT MEDICATIONS  Home Medications     Reviewed by Ting Gonzalez (Pharmacy Tech) on 10/05/18 at 0955  Med List Status: Complete   Medication Last Dose Status   aspirin EC (ECOTRIN) 81 MG Tablet Delayed Response 10/4/2018 Active   benazepril (LOTENSIN) 5 MG Tab 10/4/2018 Active   doxepin (SINEQUAN) 10 MG Cap 10/4/2018 Active   simvastatin (ZOCOR) 40 MG Tab 10/4/2018 Active   vitamin D (CHOLECALCIFEROL) 1000 UNIT Tab 10/4/2018 Active                ALLERGIES  Allergies   Allergen Reactions   • Bupropion    • Cymbalta [Duloxetine Hcl] Nausea   • Darvocet [Propoxyphene N-Apap]      Dizziness     • Fluoxetine      suicidal thoughts      • Iodine    • Lexapro    • Lipitor [Atorvastatin Calcium]      muscle aches         PHYSICAL EXAM  VITAL SIGNS: /77   Pulse 72   Temp 36.2 °C (97.2 °F)   Resp 20   Ht 1.524 m (5')   Wt 59 kg (130 lb)   LMP 06/26/1974   SpO2 93%   BMI 25.39 kg/m²    Oxygen saturation is interpreted as adequate  Constitutional: Awake verbal she does not appear toxic or distressed  HENT: I do not see any sign of trauma to the head, mucous membranes are dry  Eyes: No erythema or discharge or jaundice  Neck: Trachea midline no JVD no C-spine tenderness  Cardiovascular: Regular rate and rhythm  Lungs: Clear and equal bilaterally with no apparent difficulty breathing  Abdomen/Back: Soft nontender nondistended no peritoneal findings  Skin: Warm and dry  Musculoskeletal: No acute bony deformity  Neurologic: Awake verbal moving all extremities      Laboratory  CBC shows a normal white blood cell count of 5.8 hemoglobin is adequate at 13.3 basic metabolic panel is unremarkable LFTs are unremarkable except for minimally elevated total bilirubin of 1.6 troponin and BMP are unremarkable.  Urinalysis shows moderate leukocyte esterase positive with  2-5 white blood cells noted in the microscopic exam and bacteria were also noted    EKG interpretation  I reviewed the EMS EKG it shows a sinus rhythm 95 bpm with a left axis deviation there is poor R wave progression with Q waves noted in V1 V2 and V3 there is no pathologic ST elevation.    I also reviewed the EMS rhythm strip obtained prior to arrival it shows a sinus rhythm with bigeminal PVCs    Repeat EKG here in the emergency department shows sinus rhythm 74 bpm left axis deviation, PVCs were seen but the bigeminy pattern has resolved.  There was poor R wave progression with Q waves in V1 V2 and V3 there is no pathologic ST elevation findings are similar to cardiogram from July 21, 2015    Radiology  DX-CHEST-PORTABLE (1 VIEW)   Final Result      No consolidation.      CT-HEAD W/O    (Results Pending)         MEDICAL DECISION MAKING and DISPOSITION  In the emergency department an IV was established and the patient was placed on the cardiac monitor.  The patient was given ceftriaxone for urinary tract infection.  I reviewed the findings thus far available with the patient and her daughter and I reviewed the case with the hospitalist and the patient is admitted to the hospitalist service for further evaluation and further treatment    IMPRESSION  1.  Syncopal episode   2.  Urinary tract infection      Electronically signed by: Anthony Mills, 10/5/2018 1:13 PM

## 2018-10-05 NOTE — ED NOTES
Assisted pt with ambulation to restroom, required 1 person assist for stability. Pt able to hold her own weight just somewhat unsteady. Pt returned to bed and monitors reapplied.

## 2018-10-05 NOTE — ED NOTES
Pt arrived incont of stool. Normally continent. Pt was cleaned, placed in gown, and given warm blanket.

## 2018-10-05 NOTE — DISCHARGE PLANNING
PMR order received from Dr. Grant.  MCR is shown for her medical provider.  Presented with c/o a syncopal episode with LOC.  W/U & TX are pending.  This referral will not be forwarded to Physiatry @ this time.  TCC remains monitoring.  I do appreciate the referral.

## 2018-10-05 NOTE — ED TRIAGE NOTES
Pt arrived via [facility] from home following a syncopal episode wile getting into shower. Daughter was present and assisted pt to floor without fall. Pt denies pain but reports fuzziness/ dizzy.

## 2018-10-05 NOTE — ED NOTES
Med Rec completed per patient and [facility] pharmacy   Allergies reviewed  No ORAL antibiotics in last 30 days

## 2018-10-05 NOTE — ED NOTES
Pt's daughter came to nurse station requested snack for pt. ERP was consulted, ERP allowed pt to eat at this time.

## 2018-10-05 NOTE — H&P
Inpatient Medicine History & Physical Note    Date of Service  10/5/2018    Primary Care Physician  Yoav Wakefield M.D.    Consultants  Neurology  Cardiology    Code Status  DNR (patient states explicitly this is her request, with daughter present)    Chief Complaint  Syncope    History of Presenting Illness  83 y.o. female who presented 10/5/2018 with syncope.  The patient was taking a shower this morning, was with her daughter. The patient suddenly became unresponsive, although her eyes stayed open.  The patient could not be aroused, daughter could not get her to respond until the EMTs arrived.   Patient did respond to the EMTs, but felt brain fog.  She still feels somewhat confused, but much better than earlier today.  She does not recall the event herself.  The daughter is present, states there was no evidence of seizure activity, loss of bowel or bladder function, mouth foaming, nausea or vomiting or other signs of distress.    The patient recalls a similar episode several years ago.  At that time she did have a documented stroke.  Was found to have bilateral thromboses in the posterior cerebral arteries.  She has had some dysarthria since, but found it worse this morning.    Was noted to have some bigeminy by EMTs, has not recurred in the emergency room, has remained in normal sinus rhythm since.  No evidence of trauma, was witnessed.  The patient sees Dr. Jensen as her outpatient neurologist.    The patient's daughter is present at the bedside    Review of Systems  Review of Systems   Constitutional: Negative.    HENT: Negative.    Eyes: Negative.    Respiratory: Negative.    Cardiovascular: Negative.    Gastrointestinal: Negative.    Genitourinary: Negative.    Musculoskeletal: Negative.    Skin: Negative for rash.   Neurological: Positive for dizziness, speech change, loss of consciousness and headaches. Negative for tingling, tremors, sensory change, focal weakness and seizures.    Psychiatric/Behavioral: Positive for depression.       Past Medical History   has a past medical history of Arrhythmia; Bronchitis; Depression; Fall; Hypertension; Indigestion; Renal disorder; Stroke (HCC); and UTI (lower urinary tract infection) (7/18/2013).    Surgical History   has a past surgical history that includes laparotomy (1957); colonoscopy (2004); eye surgery (2007); cholecystectomy (1960); and abdominal hysterectomy total (1974).     Family History  family history includes Arthritis in her mother; Cancer in her brother and mother; Heart Disease in her father and mother; Lung Disease in her sister.     Social History   reports that she has never smoked. She has never used smokeless tobacco. She reports that she drinks alcohol. She reports that she does not use drugs.    Allergies  Allergies   Allergen Reactions   • Bupropion    • Cymbalta [Duloxetine Hcl] Nausea   • Darvocet [Propoxyphene N-Apap]      Dizziness     • Fluoxetine      suicidal thoughts      • Iodine    • Lexapro    • Lipitor [Atorvastatin Calcium]      muscle aches         Medications  Prior to Admission Medications   Prescriptions Last Dose Informant Patient Reported? Taking?   aspirin EC (ECOTRIN) 81 MG Tablet Delayed Response 10/4/2018 at PM Patient's Home Pharmacy Yes Yes   Sig: Take 81 mg by mouth 2 Times a Day.   benazepril (LOTENSIN) 5 MG Tab 10/4/2018 at PM Patient's Home Pharmacy No No   Sig: TAKE 1 TABLET BY MOUTH TWICE A DAY   doxepin (SINEQUAN) 10 MG Cap 10/4/2018 at PM Patient's Home Pharmacy No No   Sig: TAKE 1 CAP BY MOUTH EVERY BEDTIME.   simvastatin (ZOCOR) 40 MG Tab 10/4/2018 at PM Patient's Home Pharmacy No No   Sig: TAKE 1 TABLET BY MOUTH EVERY EVENING   vitamin D (CHOLECALCIFEROL) 1000 UNIT Tab 10/4/2018 at 1200 Patient's Home Pharmacy Yes Yes   Sig: Take 1,000 Units by mouth every day.      Facility-Administered Medications: None       Physical Exam  Temp:  [36.2 °C (97.2 °F)] 36.2 °C (97.2 °F)  Pulse:  [65-89]  72  Resp:  [16-20] 20  BP: (140)/(77) 140/77    Physical Exam   Constitutional: She is oriented to person, place, and time. She appears well-developed and well-nourished.   Appears her stated age   HENT:   Head: Normocephalic and atraumatic.   Eyes: Pupils are equal, round, and reactive to light. Conjunctivae and EOM are normal.   Neck: Normal range of motion. Neck supple.   Cardiovascular: Normal rate, regular rhythm and normal heart sounds.    Pulmonary/Chest: Effort normal and breath sounds normal.   Abdominal: Soft. Bowel sounds are normal.   Musculoskeletal: Normal range of motion.   Neurological: She is alert and oriented to person, place, and time. She displays abnormal reflex. A cranial nerve deficit is present. She exhibits normal muscle tone. Coordination abnormal.   No pronator drift  Difficulty with finger to nose testing but symmetric, slow  Positive Babinski right  Dysarthria with speaking, slow speech   Skin: Skin is warm and dry.   Psychiatric: She has a normal mood and affect. Her behavior is normal. Judgment and thought content normal.       Laboratory:  Recent Labs      10/05/18   0827   WBC  5.8   RBC  4.15*   HEMOGLOBIN  13.3   HEMATOCRIT  41.0   MCV  98.8*   MCH  32.0   MCHC  32.4*   RDW  49.7   PLATELETCT  112*   MPV  10.8     Recent Labs      10/05/18   0827   SODIUM  143   POTASSIUM  3.9   CHLORIDE  110   CO2  28   GLUCOSE  115*   BUN  17   CREATININE  0.95   CALCIUM  9.3     Recent Labs      10/05/18   0827   ALTSGPT  19   ASTSGOT  26   ALKPHOSPHAT  59   TBILIRUBIN  1.6*   GLUCOSE  115*         Recent Labs      10/05/18   0827   BNPBTYPENAT  142*         Recent Labs      10/05/18   0827   TROPONINI  <0.01       Urinalysis:    Recent Labs      10/05/18   0920   SPECGRAVITY  1.016   GLUCOSEUR  Negative   KETONES  Negative   NITRITE  Negative   LEUKESTERAS  Moderate*   WBCURINE  2-5   RBCURINE  0-2   BACTERIA  Few*   EPITHELCELL  Negative        Imaging:  Head CT  pending      Assessment/Plan:  1.  Syncope.  The patient presents with signs of a new stroke, which may have led to syncopal event as she had in the past.    Await further imaging.  Have tried to reach Neurology for c/s several times, will keep trying.  Cardiology to consult, eval whether transient arrhythmia could be responsible for syncopal event, that was witnessed and, does not appear to have been a seizure.  Aspirin, blood pressure control, statin.  Addendum:  Cardiology does not feel cardiac etiology for syncopal event earlier today.  Pt with LBBB, which is not new.  Have ordered MRI without contrast, EEG per Neurology suggestion.  CT head without contrast negative for new findings.    2.  H/o thrombotic CVA  Neg head CT.  ASA.  Pending MRI brain.    3.  HTN.  Well-controlled on lotensin.    4.  F/E/N.  Speech eval prior to oral intake, PT/OT, re-eval electrolytes in am but not abnormal today.  Anticipate d/c in 24-48 hrs if EEG and MRI negative, pt remains stable.    VTE prophylaxis: started      Addendum:  Pt c/o chronic R chest wall mass.  Painful at times.  Asked if it could be imaged.  Will order ultrasound of soft tissue swelling palpated at 10 oclock above R breast.

## 2018-10-06 ENCOUNTER — APPOINTMENT (OUTPATIENT)
Dept: RADIOLOGY | Facility: MEDICAL CENTER | Age: 83
End: 2018-10-06
Attending: INTERNAL MEDICINE
Payer: MEDICARE

## 2018-10-06 ENCOUNTER — PATIENT OUTREACH (OUTPATIENT)
Dept: HEALTH INFORMATION MANAGEMENT | Facility: OTHER | Age: 83
End: 2018-10-06

## 2018-10-06 VITALS
WEIGHT: 130 LBS | HEART RATE: 63 BPM | BODY MASS INDEX: 25.52 KG/M2 | HEIGHT: 60 IN | DIASTOLIC BLOOD PRESSURE: 67 MMHG | TEMPERATURE: 99.5 F | OXYGEN SATURATION: 93 % | SYSTOLIC BLOOD PRESSURE: 156 MMHG | RESPIRATION RATE: 18 BRPM

## 2018-10-06 LAB
ALBUMIN SERPL BCP-MCNC: 3.1 G/DL (ref 3.2–4.9)
ALBUMIN/GLOB SERPL: 1.2 G/DL
ALP SERPL-CCNC: 52 U/L (ref 30–99)
ALT SERPL-CCNC: 15 U/L (ref 2–50)
ANION GAP SERPL CALC-SCNC: 7 MMOL/L (ref 0–11.9)
AST SERPL-CCNC: 20 U/L (ref 12–45)
BILIRUB SERPL-MCNC: 1.5 MG/DL (ref 0.1–1.5)
BUN SERPL-MCNC: 14 MG/DL (ref 8–22)
CALCIUM SERPL-MCNC: 8.8 MG/DL (ref 8.5–10.5)
CHLORIDE SERPL-SCNC: 108 MMOL/L (ref 96–112)
CHOLEST SERPL-MCNC: 92 MG/DL (ref 100–199)
CO2 SERPL-SCNC: 26 MMOL/L (ref 20–33)
CREAT SERPL-MCNC: 0.83 MG/DL (ref 0.5–1.4)
EKG IMPRESSION: NORMAL
ERYTHROCYTE [DISTWIDTH] IN BLOOD BY AUTOMATED COUNT: 48.6 FL (ref 35.9–50)
GLOBULIN SER CALC-MCNC: 2.5 G/DL (ref 1.9–3.5)
GLUCOSE SERPL-MCNC: 94 MG/DL (ref 65–99)
HCT VFR BLD AUTO: 33.9 % (ref 37–47)
HDLC SERPL-MCNC: 43 MG/DL
HGB BLD-MCNC: 11.2 G/DL (ref 12–16)
LDLC SERPL CALC-MCNC: 39 MG/DL
MCH RBC QN AUTO: 32.4 PG (ref 27–33)
MCHC RBC AUTO-ENTMCNC: 33 G/DL (ref 33.6–35)
MCV RBC AUTO: 98 FL (ref 81.4–97.8)
PLATELET # BLD AUTO: 106 K/UL (ref 164–446)
PMV BLD AUTO: 10.5 FL (ref 9–12.9)
POTASSIUM SERPL-SCNC: 3.6 MMOL/L (ref 3.6–5.5)
PROT SERPL-MCNC: 5.6 G/DL (ref 6–8.2)
RBC # BLD AUTO: 3.46 M/UL (ref 4.2–5.4)
SODIUM SERPL-SCNC: 141 MMOL/L (ref 135–145)
TRIGL SERPL-MCNC: 48 MG/DL (ref 0–149)
WBC # BLD AUTO: 4.7 K/UL (ref 4.8–10.8)

## 2018-10-06 PROCEDURE — 90662 IIV NO PRSV INCREASED AG IM: CPT | Performed by: INTERNAL MEDICINE

## 2018-10-06 PROCEDURE — 80061 LIPID PANEL: CPT

## 2018-10-06 PROCEDURE — 80053 COMPREHEN METABOLIC PANEL: CPT

## 2018-10-06 PROCEDURE — 85027 COMPLETE CBC AUTOMATED: CPT

## 2018-10-06 PROCEDURE — 99217 PR OBSERVATION CARE DISCHARGE: CPT | Performed by: INTERNAL MEDICINE

## 2018-10-06 PROCEDURE — 76604 US EXAM CHEST: CPT

## 2018-10-06 PROCEDURE — 97161 PT EVAL LOW COMPLEX 20 MIN: CPT

## 2018-10-06 PROCEDURE — 96375 TX/PRO/DX INJ NEW DRUG ADDON: CPT

## 2018-10-06 PROCEDURE — G8988 SELF CARE GOAL STATUS: HCPCS | Mod: CI

## 2018-10-06 PROCEDURE — 700102 HCHG RX REV CODE 250 W/ 637 OVERRIDE(OP): Performed by: INTERNAL MEDICINE

## 2018-10-06 PROCEDURE — 90471 IMMUNIZATION ADMIN: CPT

## 2018-10-06 PROCEDURE — 99214 OFFICE O/P EST MOD 30 MIN: CPT | Performed by: PSYCHIATRY & NEUROLOGY

## 2018-10-06 PROCEDURE — 96372 THER/PROPH/DIAG INJ SC/IM: CPT

## 2018-10-06 PROCEDURE — 97165 OT EVAL LOW COMPLEX 30 MIN: CPT

## 2018-10-06 PROCEDURE — 70551 MRI BRAIN STEM W/O DYE: CPT

## 2018-10-06 PROCEDURE — 36415 COLL VENOUS BLD VENIPUNCTURE: CPT

## 2018-10-06 PROCEDURE — G8978 MOBILITY CURRENT STATUS: HCPCS | Mod: CJ

## 2018-10-06 PROCEDURE — G8987 SELF CARE CURRENT STATUS: HCPCS | Mod: CJ

## 2018-10-06 PROCEDURE — 700105 HCHG RX REV CODE 258: Performed by: INTERNAL MEDICINE

## 2018-10-06 PROCEDURE — 700111 HCHG RX REV CODE 636 W/ 250 OVERRIDE (IP): Performed by: INTERNAL MEDICINE

## 2018-10-06 PROCEDURE — G8979 MOBILITY GOAL STATUS: HCPCS | Mod: CI

## 2018-10-06 PROCEDURE — G0378 HOSPITAL OBSERVATION PER HR: HCPCS

## 2018-10-06 PROCEDURE — A9270 NON-COVERED ITEM OR SERVICE: HCPCS | Performed by: INTERNAL MEDICINE

## 2018-10-06 RX ORDER — LEVETIRACETAM 500 MG/1
500 TABLET ORAL 2 TIMES DAILY
Qty: 60 TAB | Refills: 1 | Status: SHIPPED | OUTPATIENT
Start: 2018-10-06 | End: 2018-10-30 | Stop reason: SDUPTHER

## 2018-10-06 RX ADMIN — VITAMIN D, TAB 1000IU (100/BT) 1000 UNITS: 25 TAB at 05:27

## 2018-10-06 RX ADMIN — SODIUM CHLORIDE: 9 INJECTION, SOLUTION INTRAVENOUS at 10:28

## 2018-10-06 RX ADMIN — ENOXAPARIN SODIUM 40 MG: 100 INJECTION SUBCUTANEOUS at 05:27

## 2018-10-06 RX ADMIN — SODIUM CHLORIDE 500 MG: 9 INJECTION, SOLUTION INTRAVENOUS at 11:26

## 2018-10-06 RX ADMIN — SENNOSIDES AND DOCUSATE SODIUM 2 TABLET: 8.6; 5 TABLET ORAL at 05:27

## 2018-10-06 RX ADMIN — ASPIRIN 81 MG: 81 TABLET, COATED ORAL at 05:27

## 2018-10-06 RX ADMIN — INFLUENZA A VIRUS A/MICHIGAN/45/2015 X-275 (H1N1) ANTIGEN (FORMALDEHYDE INACTIVATED), INFLUENZA A VIRUS A/SINGAPORE/INFIMH-16-0019/2016 IVR-186 (H3N2) ANTIGEN (FORMALDEHYDE INACTIVATED), AND INFLUENZA B VIRUS B/MARYLAND/15/2016 BX-69A (A B/COLORADO/6/2017-LIKE VIRUS) ANTIGEN (FORMALDEHYDE INACTIVATED) 0.5 ML: 60; 60; 60 INJECTION, SUSPENSION INTRAMUSCULAR at 11:31

## 2018-10-06 ASSESSMENT — COGNITIVE AND FUNCTIONAL STATUS - GENERAL
DRESSING REGULAR LOWER BODY CLOTHING: A LITTLE
HELP NEEDED FOR BATHING: A LITTLE
WALKING IN HOSPITAL ROOM: A LITTLE
SUGGESTED CMS G CODE MODIFIER DAILY ACTIVITY: CJ
TOILETING: A LITTLE
DAILY ACTIVITIY SCORE: 21
MOBILITY SCORE: 22
SUGGESTED CMS G CODE MODIFIER MOBILITY: CJ
CLIMB 3 TO 5 STEPS WITH RAILING: A LITTLE

## 2018-10-06 ASSESSMENT — PAIN SCALES - GENERAL
PAINLEVEL_OUTOF10: 0
PAINLEVEL_OUTOF10: 0

## 2018-10-06 ASSESSMENT — GAIT ASSESSMENTS
GAIT LEVEL OF ASSIST: STAND BY ASSIST
ASSISTIVE DEVICE: FRONT WHEEL WALKER
DISTANCE (FEET): 25

## 2018-10-06 ASSESSMENT — ACTIVITIES OF DAILY LIVING (ADL): TOILETING: INDEPENDENT

## 2018-10-06 NOTE — DISCHARGE INSTRUCTIONS
Discharge Instructions    Discharged to home by car with relative. Discharged via wheelchair, hospital escort: Yes.  Special equipment needed: Not Applicable    Be sure to schedule a follow-up appointment with your primary care doctor or any specialists as instructed.     Discharge Plan:   Diet Plan: (P) Discussed  Activity Level: (P) Discussed  Confirmed Follow up Appointment: (P) Patient to Call and Schedule Appointment  Confirmed Symptoms Management: (P) Discussed  Medication Reconciliation Updated: (P) Yes  Influenza Vaccine Indication: Indicated: 65 years and older  Influenza Vaccine Given - only chart on this line when given: Influenza Vaccine Given (See MAR)    I understand that a diet low in cholesterol, fat, and sodium is recommended for good health. Unless I have been given specific instructions below for another diet, I accept this instruction as my diet prescription.   Other diet: Regular    Special Instructions: follow-up with outpatient neurology    · Is patient discharged on Warfarin / Coumadin?   No     Depression / Suicide Risk    As you are discharged from this [facility] facility, it is important to learn how to keep safe from harming yourself.    Recognize the warning signs:  · Abrupt changes in personality, positive or negative- including increase in energy   · Giving away possessions  · Change in eating patterns- significant weight changes-  positive or negative  · Change in sleeping patterns- unable to sleep or sleeping all the time   · Unwillingness or inability to communicate  · Depression  · Unusual sadness, discouragement and loneliness  · Talk of wanting to die  · Neglect of personal appearance   · Rebelliousness- reckless behavior  · Withdrawal from people/activities they love  · Confusion- inability to concentrate     If you or a loved one observes any of these behaviors or has concerns about self-harm, here's what you can do:  · Talk about it- your feelings and reasons for harming  yourself  · Remove any means that you might use to hurt yourself (examples: pills, rope, extension cords, firearm)  · Get professional help from the community (Mental Health, Substance Abuse, psychological counseling)  · Do not be alone:Call your Safe Contact- someone whom you trust who will be there for you.  · Call your local CRISIS HOTLINE 895-4166 or 764-943-9892  · Call your local Children's Mobile Crisis Response Team [place] (839) 666-9201 or wwwTravador  · Call the toll free National Suicide Prevention Hotlines   · National Suicide Prevention Lifeline 129-800-TIRR (7822)  · NPR Line Network 800-SUICIDE (193-1505)      Seizure, Adult  When you have a seizure:  · Parts of your body may move.  · How aware or awake (conscious) you are may change.  · You may shake (convulse).  Some people have symptoms right before a seizure happens. These symptoms may include:  · Fear.  · Worry (anxiety).  · Feeling like you are going to throw up (nausea).  · Feeling like the room is spinning (vertigo).  · Feeling like you saw or heard something before (kandy vu).  · Odd tastes or smells.  · Changes in vision, such as seeing flashing lights or spots.  Seizures usually last from 30 seconds to 2 minutes. Usually, they are not harmful unless they last a long time.  Follow these instructions at home:  Medicines  · Take over-the-counter and prescription medicines only as told by your doctor.  · Avoid anything that may keep your medicine from working, such as alcohol.  Activity  · Do not do any activities that would be dangerous if you had another seizure, like driving or swimming. Wait until your doctor approves.  · If you live in the [place] ask your local DMV (department of [employer]) when you can drive.  · Rest.  Teaching others  · Teach friends and family what to do when you have a seizure. They should:  ¨ Lay you on the ground.  ¨ Protect your head and body.  ¨ Loosen any tight clothing around your  neck.  ¨ Turn you on your side.  ¨ Stay with you until you are better.  ¨ Not hold you down.  ¨ Not put anything in your mouth.  ¨ Know whether or not you need emergency care.  General instructions  · Contact your doctor each time you have a seizure.  · Avoid anything that gives you seizures.  · Keep a seizure diary. Write down:  ¨ What you think caused each seizure.  ¨ What you remember about each seizure.  · Keep all follow-up visits as told by your doctor. This is important.  Contact a doctor if:  · You have another seizure.  · You have seizures more often.  · There is any change in what happens during your seizures.  · You continue to have seizures with treatment.  · You have symptoms of being sick or having an infection.  Get help right away if:  · You have a seizure:  ¨ That lasts longer than 5 minutes.  ¨ That is different than seizures you had before.  ¨ That makes it harder to breathe.  ¨ After you hurt your head.  · After a seizure, you cannot speak or use a part of your body.  · After a seizure, you are confused or have a bad headache.  · You have two or more seizures in a row.  · You are having seizures more often.  · You do not wake up right after a seizure.  · You get hurt during a seizure.  In an emergency:  · These symptoms may be an emergency. Do not wait to see if the symptoms will go away. Get medical help right away. Call your local emergency services (911 in the [place] Do not drive yourself to the hospital.  This information is not intended to replace advice given to you by your health care provider. Make sure you discuss any questions you have with your health care provider.  Document Released: 06/05/2009 Document Revised: 08/30/2017 Document Reviewed: 08/30/2017  Elsevier Interactive Patient Education © 2017 [employer]

## 2018-10-06 NOTE — DISCHARGE SUMMARY
Discharge Summary    CHIEF COMPLAINT ON ADMISSION  Chief Complaint   Patient presents with   • Syncope       Reason for Admission  Syncope     Admission Date  10/5/2018    CODE STATUS  DNAR/DNI    HPI & HOSPITAL COURSE  This is a 83 y.o. female who presented with complaint of syncope.  Patient described that she woke up to take shower and she lost control and became unresponsive and she reported that she lost her consciousness approximately for 1 minute.  She was evaluated by cardiology and they recommended that her syncopal episode is not cardiogenic in nature. she underwent MRI brain and it did not show any acute abnormalities.  Neurology was consulted and Dr. Alberts recommended to start her on 1 dose of IV Keppra 500 mg and p.o. Keppra 500 mg twice daily.  Also, he recommended that she needs to follow-up with neurology outpatient for EEG and consultation and she should not drive.  I discussed plan of care with patient and I called  to set up an appointment with neurology and primary care provider.  I reported DMV regarding her current medical condition that she should not drive.  Patient expressed that she has not been driving for last several years.  I discussed plan of care with patient and I answered all her questions.  She feels ready to be discharge home and she denies any acute complaints.      Therefore, she is discharged in fair and stable condition to home with close outpatient follow-up.         Discharge Date  10/06/18   FOLLOW UP ITEMS POST DISCHARGE  Follow-up with primary care provider  Follow-up with neurology    DISCHARGE DIAGNOSES  Active Problems:    * No active hospital problems. *  Resolved Problems:    * No resolved hospital problems. *      FOLLOW UP  Future Appointments  Date Time Provider Department Center   10/16/2018 3:20 PM CRESENCIO Kohli [place]   10/30/2018 10:20 AM Jose Carlos Greene M.D. [facility] None     No follow-up provider specified.    MEDICATIONS ON DISCHARGE      Medication List      START taking these medications      Instructions   levETIRAcetam 500 MG Tabs  Commonly known as:  KEPPRA   Take 1 Tab by mouth 2 times a day.  Dose:  500 mg        CONTINUE taking these medications      Instructions   aspirin EC 81 MG Tbec  Commonly known as:  ECOTRIN   Take 81 mg by mouth 2 Times a Day.  Dose:  81 mg     benazepril 5 MG Tabs  Commonly known as:  LOTENSIN   TAKE 1 TABLET BY MOUTH TWICE A DAY     doxepin 10 MG Caps  Commonly known as:  SINEQUAN   TAKE 1 CAP BY MOUTH EVERY BEDTIME.  Dose:  10 mg     simvastatin 40 MG Tabs  Commonly known as:  ZOCOR   TAKE 1 TABLET BY MOUTH EVERY EVENING     vitamin D 1000 UNIT Tabs  Commonly known as:  cholecalciferol   Take 1,000 Units by mouth every day.  Dose:  1000 Units            Allergies  Allergies   Allergen Reactions   • Bupropion    • Cymbalta [Duloxetine Hcl] Nausea   • Darvocet [Propoxyphene N-Apap]      Dizziness     • Fluoxetine      suicidal thoughts      • Iodine    • Lexapro    • Lipitor [Atorvastatin Calcium]      muscle aches         DIET  Orders Placed This Encounter   Procedures   • Diet Order Regular     Standing Status:   Standing     Number of Occurrences:   1     Order Specific Question:   Diet:     Answer:   Regular [1]       ACTIVITY  As tolerated.  Weight bearing as tolerated    CONSULTATIONS  Neurology    PROCEDURES  None    LABORATORY  Lab Results   Component Value Date    SODIUM 141 10/06/2018    POTASSIUM 3.6 10/06/2018    CHLORIDE 108 10/06/2018    CO2 26 10/06/2018    GLUCOSE 94 10/06/2018    BUN 14 10/06/2018    CREATININE 0.83 10/06/2018    CREATININE 1.2 09/19/2008        Lab Results   Component Value Date    WBC 4.7 (L) 10/06/2018    HEMOGLOBIN 11.2 (L) 10/06/2018    HEMATOCRIT 33.9 (L) 10/06/2018    PLATELETCT 106 (L) 10/06/2018        Total time of the discharge process exceeds 31 minutes.

## 2018-10-06 NOTE — CONSULTS
CC:  I AM NOT SURE    Date of Admission: 10/5/2018    Today's Date: 10/06/18    Consulting Physician: DAWN Reyna      HPI:    Alix Rush is a 83 y.o. female uncertain why she is here, believes blacked out, episode described LOC  With after confusion. No trauma. No sz hx or risks noted.  No associated symptoms.  No exacerbating or alleviating factors.  No other complaints.       ROS:   PERTINENT POSITIVES IN HPI  All other systems were reviewed and were negative.       Past Medical History:   Past Medical History:   Diagnosis Date   • UTI (lower urinary tract infection) 2013   • Arrhythmia    • Bronchitis    • Depression    • Fall    • Hypertension    • Indigestion    • Renal disorder     stones   • Stroke (HCC)     TIAs and CVA       Past Surgical History:   Past Surgical History:   Procedure Laterality Date   • EYE SURGERY      bilateral cataracts   • COLONOSCOPY      normal   • ABDOMINAL HYSTERECTOMY TOTAL      benign disease   • CHOLECYSTECTOMY     • LAPAROTOMY      partial oophorectomy--benign disease       Social History:   Social History     Social History   • Marital status:      Spouse name: N/A   • Number of children: N/A   • Years of education: N/A     Occupational History   • Not on file.     Social History Main Topics   • Smoking status: Never Smoker   • Smokeless tobacco: Never Used   • Alcohol use Yes      Comment: rare   • Drug use: No   • Sexual activity: Yes     Partners: Male     Birth control/ protection: Post-Menopausal     Other Topics Concern   • Not on file     Social History Narrative   • No narrative on file       Family History:   Family History   Problem Relation Age of Onset   • Cancer Mother    • Heart Disease Mother    • Arthritis Mother         Lupus   • Heart Disease Father    • Cancer Brother    • Lung Disease Sister          from Lung Cancer at 70       HISTORIES AS ABOVE ARE INCOMPLETE IF PATIENT IS INTUBATED, OR UNABLE TO  COMMUNICATE OR ELUCIDATE.    Allergies:   Allergies   Allergen Reactions   • Bupropion    • Cymbalta [Duloxetine Hcl] Nausea   • Darvocet [Propoxyphene N-Apap]      Dizziness     • Fluoxetine      suicidal thoughts      • Iodine    • Lexapro    • Lipitor [Atorvastatin Calcium]      muscle aches           Current Facility-Administered Medications:   •  aspirin EC (ECOTRIN) tablet 81 mg, 81 mg, Oral, BID, Darlene Grant M.D., 81 mg at 10/06/18 0527  •  doxepin (SINEQUAN) capsule 10 mg, 10 mg, Oral, QHS, Darlene Grant M.D., 10 mg at 10/05/18 5982  •  simvastatin (ZOCOR) tablet 40 mg, 40 mg, Oral, Q EVENING, Darlene Grant M.D., 40 mg at 10/05/18 1639  •  vitamin D (cholecalciferol) tablet 1,000 Units, 1,000 Units, Oral, DAILY, Darlene Grant M.D., 1,000 Units at 10/06/18 0527  •  senna-docusate (PERICOLACE or SENOKOT S) 8.6-50 MG per tablet 2 Tab, 2 Tab, Oral, BID, 2 Tab at 10/06/18 0527 **AND** polyethylene glycol/lytes (MIRALAX) PACKET 1 Packet, 1 Packet, Oral, QDAY PRN **AND** magnesium hydroxide (MILK OF MAGNESIA) suspension 30 mL, 30 mL, Oral, QDAY PRN **AND** bisacodyl (DULCOLAX) suppository 10 mg, 10 mg, Rectal, QDAY PRN, Darlene Grant M.D.  •  NS infusion, , Intravenous, Continuous, Darlene Grant M.D., Last Rate: 50 mL/hr at 10/06/18 1028  •  enoxaparin (LOVENOX) inj 40 mg, 40 mg, Subcutaneous, DAILY, Darlene Grant M.D., 40 mg at 10/06/18 0527      PHYSICAL EXAM    Vitals:    10/06/18 0426 10/06/18 0800 10/06/18 1016 10/06/18 1200   BP: 119/95 134/63  156/67   Pulse: 64 69  63   Resp: 18 18  18   Temp: 36.9 °C (98.5 °F) 37.3 °C (99.1 °F)  37.5 °C (99.5 °F)   SpO2: 90% 95% 95% 93%   Weight:       Height:           Head/Neck: NCAT. no meningismus neg kernig neg brudzinski. No obvious mass or heard bruit. No tender arteries or lost pulses. No rash of head or neck. NO JVD. Left tongue bite.     Skin: Warm, dry, intact. No rashes observed head/neck or  body    Eyes/Funduscopic:  unable    Mental Status: Awake, alert, oriented x 3. Name/repeat/fluent/command follow intact. No neglect/extinction. Attention and concentration, recent & remote memory, Fund of Knowledge wnl.     Cranial Nerves:   CN II-XII intact. PERRL 3mm.   No afferent pupillary defect. EOM full. VF full. No nystagmus.       Motor:   intact, no abn mvmts.  bulk & tone wnl.      Sensory: symmetric to sharp     Coordination: dysmetria absent    DTR's: intact/sym. no clonus. Toes mute.    Gait/Station: n/a fall concern       NIHSS: 0    Labs:  Recent Labs      10/05/18   0827  10/06/18   0611   WBC  5.8  4.7*   RBC  4.15*  3.46*   HEMOGLOBIN  13.3  11.2*   HEMATOCRIT  41.0  33.9*   MCV  98.8*  98.0*   MCH  32.0  32.4   MCHC  32.4*  33.0*   RDW  49.7  48.6   PLATELETCT  112*  106*   MPV  10.8  10.5     Recent Labs      10/05/18   0827  10/06/18   0611   SODIUM  143  141   POTASSIUM  3.9  3.6   CHLORIDE  110  108   CO2  28  26   GLUCOSE  115*  94   BUN  17  14   CREATININE  0.95  0.83   CALCIUM  9.3  8.8         Recent Labs      10/05/18   0827   BNPBTYPENAT  142*     Recent Labs      10/05/18   0827   TROPONINI  <0.01   BNPBTYPENAT  142*     Recent Labs      10/06/18   0611   TRIGLYCERIDE  48   HDL  43   LDL  39     Recent Labs      10/05/18   0827  10/06/18   0611   SODIUM  143  141   POTASSIUM  3.9  3.6   CHLORIDE  110  108   CO2  28  26   GLUCOSE  115*  94   BUN  17  14     Recent Labs      10/05/18   0827  10/06/18   0611   SODIUM  143  141   POTASSIUM  3.9  3.6   CHLORIDE  110  108   CO2  28  26   BUN  17  14   CREATININE  0.95  0.83   CALCIUM  9.3  8.8         Results for orders placed or performed during the hospital encounter of 01/22/15   2-D ECHO W/DOPPLER (ECHOCARDIOGRAM COMP W/O CONT)   Result Value Ref Range    Eject.Frac. MOD BP 56.29     Eject.Frac. MOD 4C 62.79     Eject.Frac. MOD 2C 60.24               Imaging: neuroimaging reviewed and directly visualized by me  CT-HEAD W/O   Final  Result      No acute intracranial abnormality is identified.      There are periventricular and subcortical white matter changes present.  This finding is nonspecific and could be from previous small vessel ischemia, demyelination, or gliosis.      Moderate ventriculomegaly is unchanged.         DX-CHEST-PORTABLE (1 VIEW)   Final Result      No consolidation.      MR-BRAIN-W/O    (Results Pending)   US-CHEST    (Results Pending)       Assessment/Plan:      LOC SPELLS, CARD MD BELIEVES NOT SYNCOPE, LEFT TONGUE BITE     SPELLS POSSIBLE SYNCOPAL, SYNCOPE WORKUP & ILR DEFERRED TO CARD MD  EEG OTPT  /500 FIRST DOSE NOW IV  MRB PENDING    CAN DC IF MRB NEG ACUTE, F/U WITH JASMYNE OR YOLIS ROME FOR EEG  Discussed seizure hygiene, triggers, and AED compliance/side effects  Report to DMV for driving restriction; advised no activities where LOC a danger until neuro MD clears as otpt      Berny Alberts M.D.  , Neurohospitalist & Stroke  Clinical Professor, [employer]  Diplomate, Neurology & Neuroimaging

## 2018-10-06 NOTE — THERAPY
"Occupational Therapy Evaluation completed.   Functional Status:    Pt in bed when received by OT. Demonstrated bed mobility with supv and sit><stand with SBA. Pt ambulated around bed without AD to retrieve clothing. Donned briefs with SBA, required extra time. Pt declined grooming or toileting as she has already completed both tasks.  Plan of Care: Will benefit from Occupational Therapy 3 times per week  Discharge Recommendations:  Equipment: Will Continue to Assess for Equipment Needs. Post-acute therapy Discharge to home with outpatient or home health for additional skilled therapy services.    See \"Rehab Therapy-Acute\" Patient Summary Report for complete documentation.    Very pleasant 84 y/o female admitted for syncopal episode. Pt doing well today and reports that her legs feel stronger. SBA for functional mobility and ADL. Pt had no c/o dizziness or lightheadedness with mobilization. Will continue working with pt in this setting to maximize safety and independence. Anticipate that she will be able to D/C home where she lives with 2 adult children. Would benefit from  OT for home safety eval and to assess for equipment needs, may benefit from shower chair.  "

## 2018-10-06 NOTE — THERAPY
"84 y/o female adm for syncope unk etiology. CT negative , awaiting for MRI.  Pt  inst on fww use ,  due to reprots of pt not feeling too sure of herself.. Acute PT to address transfers with FWW, gait with FWW,  step ngotiation, balance and acitivity tolerance for pt to achieve higher level of function to facilitate a safe DC.    Physical Therapy Evaluation completed.   Bed Mobility:  Supine to Sit: Supervised  Transfers: Sit to Stand: Stand by Assist  Gait: Level Of Assist: Stand by Assist with Front-Wheel Walker       Plan of Care: Will benefit from Physical Therapy 4 times per week  Discharge Recommendations: Equipment: Will Continue to Assess for Equipment Needs. Post-acute therapy Discharge to home with outpatient or home health for additional skilled therapy services.    See \"Rehab Therapy-Acute\" Patient Summary Report for complete documentation.     "

## 2018-10-06 NOTE — PROGRESS NOTES
Bedside report received, care assumed. Pt A&Ox4, calm and cooperative, speech clear. Daughter at bedside. Pt denies any pain/discpomfort at this time. Calls appropriately. Bed locked in lowest position, call light and personal belongings within reach. Bed alarm on.

## 2018-10-06 NOTE — PROGRESS NOTES
"Pt back to baseline.remembered her name and . Still has some difficulty finding correct words (could not remember the word \"hospital\", but could explain what is the place she is in). However, improving with time.  "

## 2018-10-06 NOTE — PROGRESS NOTES
Monitor summary: SR:80-83, PA:.20, QRS:.08, QT:.40 with frequent PVCs, frequent bigem, and occasional trigem per strip from monitor room.

## 2018-10-06 NOTE — CARE PLAN
Problem: Communication  Goal: The ability to communicate needs accurately and effectively will improve  Outcome: PROGRESSING AS EXPECTED  Pt is able to verbalize her feelings/needs at this time. POC discussed, questions answered, pt verbalized understanding.    Problem: Infection  Goal: Will remain free from infection  Outcome: PROGRESSING AS EXPECTED  Pt assessed, medicated per MAR, no signs/symptoms of infection noted at this time.

## 2018-10-06 NOTE — PROGRESS NOTES
"Pt woke up disoriented in place, time, person, and situation. Stated \"I thought I was home\". Up to bathroom, gait steady. Started to remember things after being reoriented by RN. Still unable to give her full name and .  "

## 2018-10-16 ENCOUNTER — OFFICE VISIT (OUTPATIENT)
Dept: MEDICAL GROUP | Facility: PHYSICIAN GROUP | Age: 83
End: 2018-10-16
Payer: MEDICARE

## 2018-10-16 VITALS
TEMPERATURE: 98.1 F | WEIGHT: 136 LBS | SYSTOLIC BLOOD PRESSURE: 148 MMHG | HEART RATE: 75 BPM | DIASTOLIC BLOOD PRESSURE: 78 MMHG | OXYGEN SATURATION: 98 % | HEIGHT: 59 IN | BODY MASS INDEX: 27.42 KG/M2 | RESPIRATION RATE: 14 BRPM

## 2018-10-16 DIAGNOSIS — Z09 HOSPITAL DISCHARGE FOLLOW-UP: ICD-10-CM

## 2018-10-16 DIAGNOSIS — I63.333 CEREBROVASCULAR ACCIDENT (CVA) DUE TO BILATERAL THROMBOSIS OF POSTERIOR CEREBRAL ARTERIES (HCC): ICD-10-CM

## 2018-10-16 PROCEDURE — 99215 OFFICE O/P EST HI 40 MIN: CPT | Performed by: NURSE PRACTITIONER

## 2018-10-16 NOTE — ASSESSMENT & PLAN NOTE
Reports residual word finding difficulty from CVA.  Reports that this is occurring after this issue. Has in the past seen Dr. Jensen.

## 2018-10-16 NOTE — PROGRESS NOTES
"Chief Complaint   Patient presents with   • Hospital Follow-up       Subjective:   Alix Rush is a 83 y.o. female here today for hospital follow up    Hospital discharge follow-up  This is a 83 y.o. female who presented with complaint of syncope. Daughter reports that she was \"out\" for one minute, with eyes opened.  Did loose bowel and bladder during this time.   Patient described that she woke up to take shower and she lost control and became unresponsive and she reported that she lost her consciousness approximately for 1 minute.  She was evaluated by cardiology and they recommended that her syncopal episode is not cardiogenic in nature. she underwent MRI brain and it did not show any acute abnormalities.  Neurology was consulted and Dr. Alberts recommended to start her on 1 dose of IV Keppra 500 mg and p.o. Keppra 500 mg twice daily.  Also, he recommended that she needs to follow-up with neurology outpatient for EEG and consultation and she should not drive.  I discussed plan of care with patient and I called  to set up an appointment with neurology and primary care provider.  I reported DMV regarding her current medical condition that she should not drive.  Patient expressed that she has not been driving for last several years.  I discussed plan of care with patient and I answered all her questions.  She feels ready to be discharge home and she denies any acute complaints.     Reports previous \"spells\" in the past.  Lab work in the hospital shows slight anemia.  Reports that she is not a good eater. Reports that she had one episode of not feeling well but it was transient.    Had UTI in hospital and treated. No symptoms today.   Daughter and son are living with her currently.     Cerebrovascular accident (CVA) due to bilateral thrombosis of posterior cerebral arteries (CMS-[facility])  Reports residual word finding difficulty from CVA.  Reports that this is occurring after this issue. Has in the past " "seen Dr. Jensen.           Current medicines (including changes today)  Current Outpatient Prescriptions   Medication Sig Dispense Refill   • levETIRAcetam (KEPPRA) 500 MG Tab Take 1 Tab by mouth 2 times a day. 60 Tab 1   • aspirin EC (ECOTRIN) 81 MG Tablet Delayed Response Take 81 mg by mouth 2 Times a Day.     • vitamin D (CHOLECALCIFEROL) 1000 UNIT Tab Take 1,000 Units by mouth every day.     • doxepin (SINEQUAN) 10 MG Cap TAKE 1 CAP BY MOUTH EVERY BEDTIME. 90 Cap 1   • benazepril (LOTENSIN) 5 MG Tab TAKE 1 TABLET BY MOUTH TWICE A  Tab 1   • simvastatin (ZOCOR) 40 MG Tab TAKE 1 TABLET BY MOUTH EVERY EVENING 90 Tab 3     No current facility-administered medications for this visit.      She  has a past medical history of Arrhythmia; Bronchitis; Depression; Fall; Hypertension; Indigestion; Renal disorder; Stroke (HCC); and UTI (lower urinary tract infection) (7/18/2013).    ROS as stated in hpi  No chest pain, no shortness of breath, no abdominal pain       Objective:     Blood pressure 148/78, pulse 75, temperature 36.7 °C (98.1 °F), temperature source Temporal, resp. rate 14, height 1.499 m (4' 11\"), weight 61.7 kg (136 lb), last menstrual period 06/26/1974, SpO2 98 %, not currently breastfeeding. Body mass index is 27.47 kg/m². stable.   Physical Exam:  Constitutional: Alert, no distress.  Skin: Warm, dry, good turgor,no cyanosis, no rashes in visible areas.  Eye: Equal, round and reactive, conjunctiva clear, lids normal.  Ears: No tenderness, no discharge.  External canals are without any significant edema or erythema.    Gross auditory acuity is intact.  Nose: symmetrical without tenderness, no discharge.  Mouth/Throat: lips without lesion.  Oropharynx clear.    Neck: Trachea midline, no masses, no obvious thyroid enlargement. Range of motion within normal limits.  Neuro: Cranial nerves 2-12 grossly intact.  No sensory deficit.  Respiratory: Unlabored respiratory effort, lungs clear to auscultation, no " wheezes, no ronchi.  Cardiovascular: Normal S1, S2, no murmur, no edema.  Psych: Alert and oriented x3, normal affect and mood and judgement.        Assessment and Plan:   The following treatment plan was discussed    1. Hospital discharge follow-up  This is a new problem to me.  Hospital follow up for syncope and loc.  Has follow up appointment with Dr. Geiger for eeg on 10/30/18.  Daughter at bedside today.  ED precautions reviewed.  Reviewed all testing done at hospital.  Discussed that she was slightly anemic and would recommend adding dietary iron.  Return in 4-6 weeks to follow up with pcp    2. Cerebrovascular accident (CVA) due to bilateral thrombosis of posterior cerebral arteries (HCC)  This is anew problem to me.  Historical issue.  Expressive aphasia persists to some degree.  No CVA noted on MRI/ no bleed noted on CT scan during hospitalization.  Monitor.   Total 40 minutes face-to-face time spent with patient, with greater than 50% of the total time discussing patient's issues and symptoms as listed above in assessment and plan, as well as managing coordination of care for future evaluation and treatment.        Followup: Return in about 2 months (around 12/16/2018) for dr lerma.         Educated in proper administration of medication(s) ordered today including safety, possible SE, risks, benefits, rationale and alternatives to therapy.     Please note that this dictation was created using voice recognition software. I have made every reasonable attempt to correct obvious errors, but I expect that there are errors of grammar and possibly content that I did not discover before finalizing the note.

## 2018-10-16 NOTE — ASSESSMENT & PLAN NOTE
"This is a 83 y.o. female who presented with complaint of syncope. Daughter reports that she was \"out\" for one minute, with eyes opened.  Did loose bowel and bladder during this time.   Patient described that she woke up to take shower and she lost control and became unresponsive and she reported that she lost her consciousness approximately for 1 minute.  She was evaluated by cardiology and they recommended that her syncopal episode is not cardiogenic in nature. she underwent MRI brain and it did not show any acute abnormalities.  Neurology was consulted and Dr. Alberts recommended to start her on 1 dose of IV Keppra 500 mg and p.o. Keppra 500 mg twice daily.  Also, he recommended that she needs to follow-up with neurology outpatient for EEG and consultation and she should not drive.  I discussed plan of care with patient and I called  to set up an appointment with neurology and primary care provider.  I reported DMV regarding her current medical condition that she should not drive.  Patient expressed that she has not been driving for last several years.  I discussed plan of care with patient and I answered all her questions.  She feels ready to be discharge home and she denies any acute complaints.     Reports previous \"spells\" in the past.  Lab work in the hospital shows slight anemia.  Reports that she is not a good eater. Reports that she had one episode of not feeling well but it was transient.    Had UTI in hospital and treated. No symptoms today.   Daughter and son are living with her currently.   "

## 2018-10-16 NOTE — Clinical Note
What a hermelinda patient.  She has an appointment to follow up with you in November or December after she sees neurology for this recent episode.  JOSELYN Kohli.

## 2018-10-30 ENCOUNTER — OFFICE VISIT (OUTPATIENT)
Dept: NEUROLOGY | Facility: MEDICAL CENTER | Age: 83
End: 2018-10-30
Payer: MEDICARE

## 2018-10-30 VITALS
RESPIRATION RATE: 14 BRPM | TEMPERATURE: 97.6 F | WEIGHT: 134.2 LBS | OXYGEN SATURATION: 96 % | BODY MASS INDEX: 27.05 KG/M2 | DIASTOLIC BLOOD PRESSURE: 82 MMHG | SYSTOLIC BLOOD PRESSURE: 130 MMHG | HEIGHT: 59 IN | HEART RATE: 75 BPM

## 2018-10-30 DIAGNOSIS — G40.909 SEIZURE CEREBRAL (HCC): ICD-10-CM

## 2018-10-30 DIAGNOSIS — F03.90 DEMENTIA WITHOUT BEHAVIORAL DISTURBANCE, UNSPECIFIED DEMENTIA TYPE: ICD-10-CM

## 2018-10-30 DIAGNOSIS — R25.2 CRAMP AND SPASM: ICD-10-CM

## 2018-10-30 DIAGNOSIS — F32.A DEPRESSION, UNSPECIFIED DEPRESSION TYPE: ICD-10-CM

## 2018-10-30 PROCEDURE — 99214 OFFICE O/P EST MOD 30 MIN: CPT | Performed by: PSYCHIATRY & NEUROLOGY

## 2018-10-30 RX ORDER — MEMANTINE HYDROCHLORIDE 5 MG/1
5 TABLET ORAL 2 TIMES DAILY
Qty: 60 TAB | Refills: 5 | Status: SHIPPED | OUTPATIENT
Start: 2018-10-30 | End: 2018-11-13 | Stop reason: SDUPTHER

## 2018-10-30 RX ORDER — LEVETIRACETAM 500 MG/1
500 TABLET ORAL 2 TIMES DAILY
Qty: 180 TAB | Refills: 1 | Status: SHIPPED | OUTPATIENT
Start: 2018-10-30 | End: 2018-11-13 | Stop reason: SDUPTHER

## 2018-10-30 NOTE — PROGRESS NOTES
NEUROLOGY NOTE    Referring Physician  Yoav Wakefield M.D.      CHIEF COMPLAINT:  Passing out spell and was discharged with seizure medication  Oct 2018 Alix Rush is a 83 y.o. female uncertain why she is here, believes blacked out, episode described LOC  With after confusion. No trauma. No sz hx or risks noted.  No associated symptoms.  No exacerbating or alleviating factors.  No other complaints.   Chief Complaint   Patient presents with   • Hospital Follow-up     [facility]         PRESENT ILLNESS:   Passing out spell and was discharged with seizure medication  Oct 2018 Alix Rush is a 83 y.o. female uncertain why she is here, believes blacked out, episode described LOC  With after confusion. No trauma. No sz hx or risks noted.  No associated symptoms.  No exacerbating or alleviating factors.  No other complaints    The patient stated that, she had stroke when she was 50+ YO-- she developed stroke with speech disturbances and took her months to years to slowly back to work    The patient has trouble of expressing herself, she probably lost her memory in the past 3 years or so    PAST MEDICAL HISTORY:  Past Medical History:   Diagnosis Date   • Arrhythmia    • Bronchitis    • Depression    • Fall    • Hypertension    • Indigestion    • Renal disorder     stones   • Stroke (HCC)     TIAs and CVA   • UTI (lower urinary tract infection) 2013       PAST SURGICAL HISTORY:  Past Surgical History:   Procedure Laterality Date   • EYE SURGERY      bilateral cataracts   • COLONOSCOPY      normal   • ABDOMINAL HYSTERECTOMY TOTAL      benign disease   • CHOLECYSTECTOMY     • LAPAROTOMY      partial oophorectomy--benign disease       FAMILY HISTORY:  Family History   Problem Relation Age of Onset   • Cancer Mother    • Heart Disease Mother    • Arthritis Mother         Lupus   • Heart Disease Father    • Cancer Brother    • Lung Disease Sister          from Lung Cancer at 70        SOCIAL HISTORY:  Social History     Social History   • Marital status:      Spouse name: N/A   • Number of children: N/A   • Years of education: N/A     Occupational History   • Not on file.     Social History Main Topics   • Smoking status: Never Smoker   • Smokeless tobacco: Never Used   • Alcohol use Yes      Comment: rare   • Drug use: No   • Sexual activity: Yes     Partners: Male     Birth control/ protection: Post-Menopausal      Comment:      Other Topics Concern   • Not on file     Social History Narrative   • No narrative on file     ALLERGIES:  Allergies   Allergen Reactions   • Bupropion    • Cymbalta [Duloxetine Hcl] Nausea   • Darvocet [Propoxyphene N-Apap]      Dizziness     • Fluoxetine      suicidal thoughts      • Iodine    • Lexapro    • Lipitor [Atorvastatin Calcium]      muscle aches       TOBHX  History   Smoking Status   • Never Smoker   Smokeless Tobacco   • Never Used     ALCHX  History   Alcohol Use   • Yes     Comment: rare     DRUGHX  History   Drug Use No           MEDICATIONS:  Current Outpatient Prescriptions   Medication   • levETIRAcetam (KEPPRA) 500 MG Tab   • aspirin EC (ECOTRIN) 81 MG Tablet Delayed Response   • vitamin D (CHOLECALCIFEROL) 1000 UNIT Tab   • doxepin (SINEQUAN) 10 MG Cap   • benazepril (LOTENSIN) 5 MG Tab   • simvastatin (ZOCOR) 40 MG Tab     No current facility-administered medications for this visit.        REVIEW OF SYSTEM:    Constitutional: Denies fevers, Denies weight changes   Eyes: Denies changes in vision, no eye pain   Ears/Nose/Throat/Mouth: Denies nasal congestion or sore throat   Cardiovascular: Denies chest pain or palpitations   Respiratory: Denies SOB.   Gastrointestinal/Hepatic: Denies abdominal pain, nausea, vomiting, diarrhea, constipation or GI bleeding   Genitourinary: Denies bladder dysfunction, dysuria or frequency   Musculoskeletal/Rheum: Denies joint pain and swelling   Skin/Breast: Denies rash, denies breast lumps or  "discharge   Neurological: memory problems, spells of passing out, hx of stroke  Psychiatric: denies mood disorder   Endocrine: denies hx of diabetes or thyroid dysfunction   Heme/Oncology/Lymph Nodes: Denies enlarged lymph nodes, denies brusing or known bleeding disorder   Allergic/Immunologic: Denies hx of allergies         PHYSICAL AND NEUROLOGICAL EXMAINATIONS:  VITAL SIGNS: /82 (BP Location: Right arm, Patient Position: Sitting, BP Cuff Size: Adult)   Pulse 75   Temp 36.4 °C (97.6 °F) (Temporal)   Resp 14   Ht 1.499 m (4' 11\")   Wt 60.9 kg (134 lb 3.2 oz)   LMP 06/26/1974   SpO2 96%   BMI 27.11 kg/m²   CURRENT WEIGHT:   BMI: Body mass index is 27.11 kg/m².  PREVIOUS WEIGHTS:  Wt Readings from Last 25 Encounters:   10/30/18 60.9 kg (134 lb 3.2 oz)   10/16/18 61.7 kg (136 lb)   10/05/18 59 kg (130 lb)   05/30/18 59.4 kg (131 lb)   03/21/18 62.1 kg (137 lb)   01/17/18 63 kg (139 lb)   11/15/17 63.5 kg (140 lb)   10/25/17 62.6 kg (138 lb)   09/12/17 64.4 kg (142 lb)   07/25/17 66.2 kg (146 lb)   05/23/17 68 kg (150 lb)   03/14/17 71.2 kg (157 lb)   11/08/16 77.1 kg (170 lb)   08/23/16 77.1 kg (170 lb)   06/20/16 76.2 kg (168 lb)   09/10/15 75.3 kg (166 lb)   07/30/15 75.8 kg (167 lb)   07/21/15 76.2 kg (168 lb)   07/20/15 72.6 kg (160 lb)   07/20/15 75.8 kg (167 lb)   05/28/15 75.8 kg (167 lb)   05/26/15 75.3 kg (166 lb)   04/13/15 76.2 kg (168 lb)   03/23/15 74.8 kg (165 lb)   03/13/15 76.2 kg (168 lb)       General appearance of patient: WDWN(+) NAD(+)    EYES  o Fundus : Papilledem(-) Exudates(-) Hemorrhage(-)  Nervous System  Orientation to time, place and person(+)  Memory normal(-)  Language: aphasia(-)  Knowledge: past(+) Current(+)  Attention(+)  Cranial Nerves  • Nerve 2: intact  • Nerve 3,4,6: intact  • Nerve 5 : intact  • Nerve 7: intact  • Nerve 8: intact  • Nerve 9 & 10: intact  • Nerve 11: intact  • Nerve 12: intact  Muscle Power and muscle tone: symmetric, normal in upper and " lower  Sensory System: Pin sensation intact(+)  Reflexes: symmetric throughout  Cerebellar Function FNP normal   Gait : Steady(+) TandemGait steady(-)  Heart and Vascular  Peripheral Vasucular system : Edema (-) Swelling(-)  RHB, Breathing sound clear  abdomen bowel sound normoactive  Extremities freely moveable  Joints no contracture       NEUROIMAGING: I reviewed the MRI/CT of brain       LAB:            IMPRESSION:    1. Spells of passing out-- 2018 Oct, 2012, 2015-- likely to be seizures  2. Dementia, Hx of old strokes? HTN, Depression (Reactive Depression-- lost of her )    PLAN/RECOMMENDATIONS:      The patient may have seizures on top of dementia  I do agree with the use of Keppra    Continue Keppra 500mg two times per day  Add Namenda 5mg two times per day  If any side effects, please stop and notify us,       Please notify us if depression worsenes--  We will offer EEG and blood tests to exclude reversible cause of memory problems        SIGNATURE:  Jose Carlos Greene      CC:  Yoav Wakefield M.D.    2015 2018  Brain Atrophy

## 2018-10-30 NOTE — PATIENT INSTRUCTIONS
IMPRESSION:    1. Spells of passing out-- 2018 Oct, 2012, 2015-- likely to be seizures  2. Dementia, Hx of old strokes? HTN, Depression (Reactive Depression-- lost of her )    PLAN/RECOMMENDATIONS:      The patient may have seizures on top of dementia  I do agree with the use of Keppra    Continue Keppra 500mg two times per day  Add Namenda 5mg two times per day  If any side effects, please stop and notify us,       Please notify us if depression worsenes--  We will offer EEG and blood tests to exclude reversible cause of memory problems        SIGNATURE:  Jose Carlos Greene      CC:  Yoav Wakefield M.D.    2015 2018  Brain Atrophy

## 2018-11-06 ENCOUNTER — APPOINTMENT (OUTPATIENT)
Dept: LAB | Facility: MEDICAL CENTER | Age: 83
End: 2018-11-06
Payer: MEDICARE

## 2018-11-06 ENCOUNTER — HOSPITAL ENCOUNTER (OUTPATIENT)
Dept: LAB | Facility: MEDICAL CENTER | Age: 83
End: 2018-11-06
Attending: PSYCHIATRY & NEUROLOGY
Payer: MEDICARE

## 2018-11-06 DIAGNOSIS — R25.2 CRAMP AND SPASM: ICD-10-CM

## 2018-11-06 DIAGNOSIS — G40.909 SEIZURE CEREBRAL (HCC): ICD-10-CM

## 2018-11-06 DIAGNOSIS — F32.A DEPRESSION, UNSPECIFIED DEPRESSION TYPE: ICD-10-CM

## 2018-11-06 DIAGNOSIS — F03.90 DEMENTIA WITHOUT BEHAVIORAL DISTURBANCE, UNSPECIFIED DEMENTIA TYPE: ICD-10-CM

## 2018-11-06 LAB
LACTATE BLD-SCNC: 0.9 MMOL/L (ref 0.5–2)
MAGNESIUM SERPL-MCNC: 1.8 MG/DL (ref 1.5–2.5)
THYROPEROXIDASE AB SERPL-ACNC: 9.6 IU/ML (ref 0–9)
VIT B12 SERPL-MCNC: 295 PG/ML (ref 211–911)

## 2018-11-06 PROCEDURE — 84207 ASSAY OF VITAMIN B-6: CPT

## 2018-11-06 PROCEDURE — 84425 ASSAY OF VITAMIN B-1: CPT

## 2018-11-06 PROCEDURE — 84630 ASSAY OF ZINC: CPT

## 2018-11-06 PROCEDURE — 36415 COLL VENOUS BLD VENIPUNCTURE: CPT

## 2018-11-06 PROCEDURE — 83735 ASSAY OF MAGNESIUM: CPT

## 2018-11-06 PROCEDURE — 86235 NUCLEAR ANTIGEN ANTIBODY: CPT

## 2018-11-06 PROCEDURE — 82607 VITAMIN B-12: CPT

## 2018-11-06 PROCEDURE — 86800 THYROGLOBULIN ANTIBODY: CPT

## 2018-11-06 PROCEDURE — 86376 MICROSOMAL ANTIBODY EACH: CPT

## 2018-11-06 PROCEDURE — 86038 ANTINUCLEAR ANTIBODIES: CPT

## 2018-11-06 PROCEDURE — 86147 CARDIOLIPIN ANTIBODY EA IG: CPT

## 2018-11-06 PROCEDURE — 83605 ASSAY OF LACTIC ACID: CPT

## 2018-11-08 LAB — THYROGLOB AB SERPL-ACNC: <0.9 IU/ML (ref 0–4)

## 2018-11-09 ENCOUNTER — TELEPHONE (OUTPATIENT)
Dept: NEUROLOGY | Facility: MEDICAL CENTER | Age: 83
End: 2018-11-09

## 2018-11-09 LAB
CARDIOLIPIN IGA SER IA-ACNC: 3 APL (ref 0–11)
CARDIOLIPIN IGG SER IA-ACNC: 2 GPL (ref 0–14)
CARDIOLIPIN IGM SER IA-ACNC: 0 MPL (ref 0–12)
ENA SS-B IGG SER IA-ACNC: 0 AU/ML (ref 0–40)
NUCLEAR IGG SER QL IA: NORMAL
SSA52 R0ENA AB IGG Q0420: 4 AU/ML (ref 0–40)
SSA60 R0ENA AB IGG Q0419: 1 AU/ML (ref 0–40)
ZINC BLD-MCNC: 630.7 UG/DL (ref 440–860)

## 2018-11-10 LAB — VIT B6 SERPL-MCNC: 23.7 NMOL/L (ref 20–125)

## 2018-11-11 LAB — VIT B1 BLD-MCNC: 55 NMOL/L (ref 70–180)

## 2018-11-13 ENCOUNTER — OFFICE VISIT (OUTPATIENT)
Dept: MEDICAL GROUP | Facility: PHYSICIAN GROUP | Age: 83
End: 2018-11-13
Payer: MEDICARE

## 2018-11-13 ENCOUNTER — TELEPHONE (OUTPATIENT)
Dept: NEUROLOGY | Facility: MEDICAL CENTER | Age: 83
End: 2018-11-13

## 2018-11-13 VITALS
WEIGHT: 134 LBS | BODY MASS INDEX: 27.01 KG/M2 | TEMPERATURE: 97.7 F | DIASTOLIC BLOOD PRESSURE: 64 MMHG | HEART RATE: 84 BPM | SYSTOLIC BLOOD PRESSURE: 134 MMHG | RESPIRATION RATE: 16 BRPM | OXYGEN SATURATION: 94 % | HEIGHT: 59 IN

## 2018-11-13 DIAGNOSIS — R47.02 EXPRESSIVE DYSPHASIA: ICD-10-CM

## 2018-11-13 DIAGNOSIS — I10 ESSENTIAL HYPERTENSION: ICD-10-CM

## 2018-11-13 DIAGNOSIS — I63.333 CEREBROVASCULAR ACCIDENT (CVA) DUE TO BILATERAL THROMBOSIS OF POSTERIOR CEREBRAL ARTERIES (HCC): ICD-10-CM

## 2018-11-13 DIAGNOSIS — R41.3 MEMORY DIFFICULTIES: ICD-10-CM

## 2018-11-13 DIAGNOSIS — R55 SYNCOPE, UNSPECIFIED SYNCOPE TYPE: ICD-10-CM

## 2018-11-13 DIAGNOSIS — G40.909 SEIZURE CEREBRAL (HCC): ICD-10-CM

## 2018-11-13 PROCEDURE — 99214 OFFICE O/P EST MOD 30 MIN: CPT | Performed by: FAMILY MEDICINE

## 2018-11-13 RX ORDER — LEVETIRACETAM 500 MG/1
500 TABLET ORAL 2 TIMES DAILY
Qty: 180 TAB | Refills: 3 | Status: SHIPPED | OUTPATIENT
Start: 2018-11-13 | End: 2019-09-17 | Stop reason: SDUPTHER

## 2018-11-13 RX ORDER — MEMANTINE HYDROCHLORIDE 5 MG/1
5 TABLET ORAL 2 TIMES DAILY
Qty: 180 TAB | Refills: 3 | Status: SHIPPED | OUTPATIENT
Start: 2018-11-13 | End: 2019-09-17 | Stop reason: SDUPTHER

## 2018-11-13 NOTE — TELEPHONE ENCOUNTER
Called mail box not set up.    Blood tests are not remarkable.    Spoke to patient gave above results. She will call with any concerns.

## 2018-11-13 NOTE — TELEPHONE ENCOUNTER
Results for YAMILE PERRY (MRN [id number]) as of 11/13/2018 13:49   Ref. Range 11/6/2018 10:51   Vitamin B1 Latest Ref Range: 70 - 180 nmol/L 55 (L)     Please take  Vitamin B1( thiamine) 100mg daily

## 2018-11-13 NOTE — PATIENT INSTRUCTIONS
Patient and her daughter given written instructions regarding labs, imaging, medications, referrals, dietary and lifestyle management, and return visit.    Yoav Wakefield MD

## 2018-11-13 NOTE — PROGRESS NOTES
The patient comes in, brought by her daughter after she had a syncopal episode and was hospitalized.  She was felt to be suffering from possible seizure disorder.  She was put on Keppra 500 mg p.o. twice daily.  She not had any more syncopal episodes.  She is not driving.  Her daughter does all of her driving for her.  Patient does have issues with expressive dysphasia after she had a CVA back in 2017.  Imaging during the admission this time did not show any new CVAs.  She was put on donezepil 5 mg p.o. daily and is tolerating that well.  She saw Dr. Greene the neurologist and did not like him.  She said he did not listen to her.  She wants to be referred back to Dr. Jensen her original neurologist.    I reviewed the following    Past Medical History:   Diagnosis Date   • Arrhythmia    • Bronchitis    • Depression    • Fall    • Hypertension    • Indigestion    • Renal disorder     stones   • Seizure (HCC)    • Stroke (HCC)     TIAs and CVA   • UTI (lower urinary tract infection) 7/18/2013        Past Surgical History:   Procedure Laterality Date   • EYE SURGERY  2007    bilateral cataracts   • COLONOSCOPY  2004    normal   • ABDOMINAL HYSTERECTOMY TOTAL  1974    benign disease   • CHOLECYSTECTOMY  1960   • LAPAROTOMY  1957    partial oophorectomy--benign disease       Allergies   Allergen Reactions   • Bupropion    • Cymbalta [Duloxetine Hcl] Nausea   • Darvocet [Propoxyphene N-Apap]      Dizziness     • Fluoxetine      suicidal thoughts      • Iodine    • Lexapro    • Lipitor [Atorvastatin Calcium]      muscle aches         Current Outpatient Prescriptions   Medication Sig Dispense Refill   • memantine (NAMENDA) 5 MG Tab Take 1 Tab by mouth 2 times a day. 180 Tab 3   • levETIRAcetam (KEPPRA) 500 MG Tab Take 1 Tab by mouth 2 times a day. 180 Tab 3   • aspirin EC (ECOTRIN) 81 MG Tablet Delayed Response Take 81 mg by mouth 2 Times a Day.     • vitamin D (CHOLECALCIFEROL) 1000 UNIT Tab Take 1,000 Units by mouth every  day.     • doxepin (SINEQUAN) 10 MG Cap TAKE 1 CAP BY MOUTH EVERY BEDTIME. 90 Cap 1   • benazepril (LOTENSIN) 5 MG Tab TAKE 1 TABLET BY MOUTH TWICE A  Tab 1   • simvastatin (ZOCOR) 40 MG Tab TAKE 1 TABLET BY MOUTH EVERY EVENING 90 Tab 3     No current facility-administered medications for this visit.         Family History   Problem Relation Age of Onset   • Cancer Mother    • Heart Disease Mother    • Arthritis Mother         Lupus   • Heart Disease Father    • Cancer Brother    • Lung Disease Sister          from Lung Cancer at 70       Social History     Social History   • Marital status:      Spouse name: N/A   • Number of children: N/A   • Years of education: N/A     Occupational History   • Not on file.     Social History Main Topics   • Smoking status: Never Smoker   • Smokeless tobacco: Never Used   • Alcohol use Yes      Comment: rare   • Drug use: No   • Sexual activity: Yes     Partners: Male     Birth control/ protection: Post-Menopausal      Comment:      Other Topics Concern   • Not on file     Social History Narrative   • No narrative on file      Physical Exam   Constitutional: She is oriented. She appears well-developed and well-nourished. No distress.  She does have some expressive dysphasia but it is stable.  HENT:  Head: Normocephalic and atraumatic.   Right Ear: External ear normal. Ear canal and TM normal   Left Ear: External ear normal. Ear canal and TM normal  Nose: Nose normal.   Mouth/Throat: Oropharynx is clear and moist.   Eyes: Conjunctivae and extraocular motions are normal. Pupils are equal, round, and reactive to light. Fundi benign bilaterally   Neck: No thyromegaly present.   Cardiovascular: Normal rate, regular rhythm, normal heart sounds and intact distal pulses.  Exam reveals no gallop.    No murmur heard.  Pulmonary/Chest: Effort normal and breath sounds normal. No respiratory distress. She has no wheezes. She has no rales.   Abdominal: Soft. Bowel sounds  are normal. No hepatosplenomegaly She exhibits no distension. No tenderness. She has no rebound and no guarding.   Musculoskeletal: Normal range of motion. She exhibits no edema and no tenderness.   is equal and strong bilaterally.  She has no drift when I had her put her arms out in front of her close her eyes.  Lymphadenopathy:     She has no cervical adenopathy.   No supraclavicular adenopathy  Neurological: She is alert and oriented. She has normal reflexes.        Babinskis downgoing bilaterally   Skin: Skin is warm and dry. No rash noted. No erythema.   Psychiatric: She has a normal mood and appropriate affect. Her behavior is normal.  She does have some decreased recent memory.    1. Syncope, unspecified syncope type  levETIRAcetam (KEPPRA) 500 MG Tab--continue 1 p.o. twice daily    REFERRAL TO NEUROLOGY--Dr. Jensen   2. Essential hypertension   controlled   3. Cerebrovascular accident (CVA) due to bilateral thrombosis of posterior cerebral arteries (HCC)  REFERRAL TO NEUROLOGY--Dr. Jensen   4. Seizure cerebral  levETIRAcetam (KEPPRA) 500 MG Tab    REFERRAL TO NEUROLOGY   5. Expressive dysphasia   stable   6. Memory difficulties  memantine (NAMENDA) 5 MG Tab--continue 1 p.o. daily     Please note that this dictation was created using voice recognition software. I have worked with consultants from the vendor as well as technical experts from [facility] [employer] to optimize the interface. I have made every reasonable attempt to correct obvious errors, but I expect that there are errors of grammar and possibly content that I did not discover before finalizing the note.      Recheck 3 months or as needed

## 2018-12-17 RX ORDER — SIMVASTATIN 40 MG
TABLET ORAL
Qty: 90 TAB | Refills: 3 | Status: SHIPPED | OUTPATIENT
Start: 2018-12-17 | End: 2019-09-17 | Stop reason: SDUPTHER

## 2019-02-19 ENCOUNTER — HOSPITAL ENCOUNTER (OUTPATIENT)
Dept: LAB | Facility: MEDICAL CENTER | Age: 84
End: 2019-02-19
Attending: FAMILY MEDICINE
Payer: MEDICARE

## 2019-02-19 ENCOUNTER — OFFICE VISIT (OUTPATIENT)
Dept: MEDICAL GROUP | Facility: PHYSICIAN GROUP | Age: 84
End: 2019-02-19
Payer: MEDICARE

## 2019-02-19 VITALS
OXYGEN SATURATION: 94 % | BODY MASS INDEX: 26.81 KG/M2 | WEIGHT: 133 LBS | SYSTOLIC BLOOD PRESSURE: 138 MMHG | DIASTOLIC BLOOD PRESSURE: 68 MMHG | TEMPERATURE: 98.2 F | RESPIRATION RATE: 14 BRPM | HEART RATE: 85 BPM | HEIGHT: 59 IN

## 2019-02-19 DIAGNOSIS — I10 ESSENTIAL HYPERTENSION: ICD-10-CM

## 2019-02-19 DIAGNOSIS — R68.89 COLD INTOLERANCE: ICD-10-CM

## 2019-02-19 DIAGNOSIS — F32.1 MODERATE SINGLE CURRENT EPISODE OF MAJOR DEPRESSIVE DISORDER (HCC): ICD-10-CM

## 2019-02-19 DIAGNOSIS — R47.02 EXPRESSIVE DYSPHASIA: ICD-10-CM

## 2019-02-19 DIAGNOSIS — F03.90 DEMENTIA WITHOUT BEHAVIORAL DISTURBANCE, UNSPECIFIED DEMENTIA TYPE: ICD-10-CM

## 2019-02-19 DIAGNOSIS — F43.21 GRIEF: ICD-10-CM

## 2019-02-19 DIAGNOSIS — G40.909 SEIZURE CEREBRAL (HCC): ICD-10-CM

## 2019-02-19 LAB
BASOPHILS # BLD AUTO: 0.7 % (ref 0–1.8)
BASOPHILS # BLD: 0.03 K/UL (ref 0–0.12)
EOSINOPHIL # BLD AUTO: 0.14 K/UL (ref 0–0.51)
EOSINOPHIL NFR BLD: 3.5 % (ref 0–6.9)
ERYTHROCYTE [DISTWIDTH] IN BLOOD BY AUTOMATED COUNT: 52.3 FL (ref 35.9–50)
HCT VFR BLD AUTO: 39.5 % (ref 37–47)
HGB BLD-MCNC: 12.6 G/DL (ref 12–16)
IMM GRANULOCYTES # BLD AUTO: 0 K/UL (ref 0–0.11)
IMM GRANULOCYTES NFR BLD AUTO: 0 % (ref 0–0.9)
LYMPHOCYTES # BLD AUTO: 1.36 K/UL (ref 1–4.8)
LYMPHOCYTES NFR BLD: 33.9 % (ref 22–41)
MCH RBC QN AUTO: 32.7 PG (ref 27–33)
MCHC RBC AUTO-ENTMCNC: 31.9 G/DL (ref 33.6–35)
MCV RBC AUTO: 102.6 FL (ref 81.4–97.8)
MONOCYTES # BLD AUTO: 0.41 K/UL (ref 0–0.85)
MONOCYTES NFR BLD AUTO: 10.2 % (ref 0–13.4)
NEUTROPHILS # BLD AUTO: 2.07 K/UL (ref 2–7.15)
NEUTROPHILS NFR BLD: 51.7 % (ref 44–72)
NRBC # BLD AUTO: 0 K/UL
NRBC BLD-RTO: 0 /100 WBC
PLATELET # BLD AUTO: 110 K/UL (ref 164–446)
PMV BLD AUTO: 11.8 FL (ref 9–12.9)
RBC # BLD AUTO: 3.85 M/UL (ref 4.2–5.4)
T4 FREE SERPL-MCNC: 0.9 NG/DL (ref 0.53–1.43)
TSH SERPL DL<=0.005 MIU/L-ACNC: 2.65 UIU/ML (ref 0.38–5.33)
WBC # BLD AUTO: 4 K/UL (ref 4.8–10.8)

## 2019-02-19 PROCEDURE — 84439 ASSAY OF FREE THYROXINE: CPT

## 2019-02-19 PROCEDURE — 99214 OFFICE O/P EST MOD 30 MIN: CPT | Performed by: FAMILY MEDICINE

## 2019-02-19 PROCEDURE — 84443 ASSAY THYROID STIM HORMONE: CPT

## 2019-02-19 PROCEDURE — 85025 COMPLETE CBC W/AUTO DIFF WBC: CPT

## 2019-02-19 PROCEDURE — 36415 COLL VENOUS BLD VENIPUNCTURE: CPT

## 2019-02-19 RX ORDER — BENAZEPRIL HYDROCHLORIDE 5 MG/1
TABLET ORAL
Qty: 180 TAB | Refills: 3 | Status: SHIPPED | OUTPATIENT
Start: 2019-02-19 | End: 2020-03-18

## 2019-02-19 RX ORDER — DOXEPIN HYDROCHLORIDE 10 MG/1
10 CAPSULE ORAL
Qty: 90 CAP | Refills: 3 | Status: SHIPPED | OUTPATIENT
Start: 2019-02-19 | End: 2020-03-19

## 2019-02-19 ASSESSMENT — PATIENT HEALTH QUESTIONNAIRE - PHQ9
4. FEELING TIRED OR HAVING LITTLE ENERGY: NOT AT ALL
2. FEELING DOWN, DEPRESSED, IRRITABLE, OR HOPELESS: MORE THAN HALF THE DAYS
9. THOUGHTS THAT YOU WOULD BE BETTER OFF DEAD, OR OF HURTING YOURSELF: SEVERAL DAYS
3. TROUBLE FALLING OR STAYING ASLEEP OR SLEEPING TOO MUCH: NOT AT ALL
6. FEELING BAD ABOUT YOURSELF - OR THAT YOU ARE A FAILURE OR HAVE LET YOURSELF OR YOUR FAMILY DOWN: MORE THAN HALF THE DAYS
5. POOR APPETITE OR OVEREATING: NEARLY EVERY DAY
1. LITTLE INTEREST OR PLEASURE IN DOING THINGS: NOT AT ALL
SUM OF ALL RESPONSES TO PHQ QUESTIONS 1-9: 12
8. MOVING OR SPEAKING SO SLOWLY THAT OTHER PEOPLE COULD HAVE NOTICED. OR THE OPPOSITE, BEING SO FIGETY OR RESTLESS THAT YOU HAVE BEEN MOVING AROUND A LOT MORE THAN USUAL: MORE THAN HALF THE DAYS
7. TROUBLE CONCENTRATING ON THINGS, SUCH AS READING THE NEWSPAPER OR WATCHING TELEVISION: MORE THAN HALF THE DAYS
SUM OF ALL RESPONSES TO PHQ9 QUESTIONS 1 AND 2: 2

## 2019-02-19 NOTE — PROGRESS NOTES
The patient comes in for follow-up of her seizure disorder.  She has seen Dr. Jensen her neurologist to do an EEG on her that showed no more seizure activity while she is on Keppra 500 mg p.o. twice daily.  She is tolerating that medicine without problems.  She is feeling better.  She says she is less depressed.  She still misses her  but says she is getting used to his loss.  She complains of being cold all the time.  Were going to recheck her thyroid and get a CBC.  She has no chest pains and no shortness of breath.    I reviewed the following    Past Medical History:   Diagnosis Date   • Arrhythmia    • Bronchitis    • Depression    • Fall    • Hypertension    • Indigestion    • Renal disorder     stones   • Seizure (HCC)    • Stroke (HCC)     TIAs and CVA   • UTI (lower urinary tract infection) 7/18/2013        Past Surgical History:   Procedure Laterality Date   • EYE SURGERY  2007    bilateral cataracts   • COLONOSCOPY  2004    normal   • ABDOMINAL HYSTERECTOMY TOTAL  1974    benign disease   • CHOLECYSTECTOMY  1960   • LAPAROTOMY  1957    partial oophorectomy--benign disease       Allergies   Allergen Reactions   • Bupropion    • Cymbalta [Duloxetine Hcl] Nausea   • Darvocet [Propoxyphene N-Apap]      Dizziness     • Fluoxetine      suicidal thoughts      • Iodine    • Lexapro    • Lipitor [Atorvastatin Calcium]      muscle aches         Current Outpatient Prescriptions   Medication Sig Dispense Refill   • doxepin (SINEQUAN) 10 MG Cap Take 1 Cap by mouth every bedtime. 90 Cap 3   • benazepril (LOTENSIN) 5 MG Tab TAKE 1 TABLET BY MOUTH TWICE A  Tab 3   • simvastatin (ZOCOR) 40 MG Tab TAKE 1 TABLET BY MOUTH EVERY EVENING 90 Tab 3   • memantine (NAMENDA) 5 MG Tab Take 1 Tab by mouth 2 times a day. 180 Tab 3   • levETIRAcetam (KEPPRA) 500 MG Tab Take 1 Tab by mouth 2 times a day. 180 Tab 3   • aspirin EC (ECOTRIN) 81 MG Tablet Delayed Response Take 81 mg by mouth 2 Times a Day.     • vitamin D  (CHOLECALCIFEROL) 1000 UNIT Tab Take 1,000 Units by mouth every day.       No current facility-administered medications for this visit.         Family History   Problem Relation Age of Onset   • Cancer Mother    • Heart Disease Mother    • Arthritis Mother         Lupus   • Heart Disease Father    • Cancer Brother    • Lung Disease Sister          from Lung Cancer at 70       Social History     Social History   • Marital status:      Spouse name: N/A   • Number of children: N/A   • Years of education: N/A     Occupational History   • Not on file.     Social History Main Topics   • Smoking status: Never Smoker   • Smokeless tobacco: Never Used   • Alcohol use Yes      Comment: rare   • Drug use: No   • Sexual activity: Yes     Partners: Male     Birth control/ protection: Post-Menopausal      Comment:      Other Topics Concern   • Not on file     Social History Narrative   • No narrative on file      Physical Exam   Constitutional: She is oriented. She appears well-developed and well-nourished. No distress.   HENT:  Head: Normocephalic and atraumatic.   Right Ear: External ear normal. Ear canal and TM normal   Left Ear: External ear normal. Ear canal and TM normal  Nose: Nose normal.   Mouth/Throat: Oropharynx is clear and moist.   Eyes: Conjunctivae and extraocular motions are normal. Pupils are equal, round, and reactive to light. Fundi benign bilaterally   Neck: No thyromegaly present.   Cardiovascular: Normal rate, regular rhythm, normal heart sounds and intact distal pulses.  Exam reveals no gallop.    No murmur heard.  Pulmonary/Chest: Effort normal and breath sounds normal. No respiratory distress. She has no wheezes. She has no rales.   Abdominal: Soft. Bowel sounds are normal. No hepatosplenomegaly She exhibits no distension. No tenderness. She has no rebound and no guarding.   Musculoskeletal: Normal range of motion. She exhibits no edema and no tenderness.   Lymphadenopathy:     She has no  cervical adenopathy.   No supraclavicular adenopathy  Neurological: She is alert and oriented. She has normal reflexes.        Babinskis downgoing bilaterally   Skin: Skin is warm and dry. No rash noted. No erythema.   Psychiatric: She has a normal mood and appropriate affect. Her behavior is normal. Judgment and thought content normal.     1. Seizure cerebral   well-controlled on Keppra  Continue to see Dr. Jensen   2. Dementia without behavioral disturbance, unspecified dementia type   stable   3. Expressive dysphasia   improving.  Her speech sounded better today.   4. Grief  doxepin (SINEQUAN) 10 MG Cap--refill   5. Moderate single current episode of major depressive disorder (HCC)  doxepin (SINEQUAN) 10 MG Cap--refill   6. Essential hypertension --controlled benazepril (LOTENSIN) 5 MG Tab    CBC WITH DIFFERENTIAL   7. Cold intolerance  CBC WITH DIFFERENTIAL    TSH    FREE THYROXINE     Please note that this dictation was created using voice recognition software. I have worked with consultants from the vendor as well as technical experts from [facility] [employer] to optimize the interface. I have made every reasonable attempt to correct obvious errors, but I expect that there are errors of grammar and possibly content that I did not discover before finalizing the note.    Recheck 2 months or as needed

## 2019-04-17 ENCOUNTER — OFFICE VISIT (OUTPATIENT)
Dept: MEDICAL GROUP | Facility: PHYSICIAN GROUP | Age: 84
End: 2019-04-17
Payer: MEDICARE

## 2019-04-17 VITALS
DIASTOLIC BLOOD PRESSURE: 62 MMHG | BODY MASS INDEX: 26 KG/M2 | HEIGHT: 59 IN | TEMPERATURE: 97.2 F | HEART RATE: 68 BPM | SYSTOLIC BLOOD PRESSURE: 128 MMHG | WEIGHT: 129 LBS | OXYGEN SATURATION: 92 % | RESPIRATION RATE: 16 BRPM

## 2019-04-17 DIAGNOSIS — G40.909 SEIZURE CEREBRAL (HCC): ICD-10-CM

## 2019-04-17 DIAGNOSIS — R60.9 EDEMA, UNSPECIFIED TYPE: ICD-10-CM

## 2019-04-17 DIAGNOSIS — I49.3 ASYMPTOMATIC PVCS: ICD-10-CM

## 2019-04-17 DIAGNOSIS — F32.A DEPRESSION, UNSPECIFIED DEPRESSION TYPE: ICD-10-CM

## 2019-04-17 DIAGNOSIS — I10 ESSENTIAL HYPERTENSION: ICD-10-CM

## 2019-04-17 PROCEDURE — 99214 OFFICE O/P EST MOD 30 MIN: CPT | Performed by: FAMILY MEDICINE

## 2019-04-18 NOTE — PROGRESS NOTES
Patient comes in, brought in by her daughter-in-law.  She had another fall but did not hurt herself.  She did not lose consciousness.  She says she is not hurting anywhere now.  She is not due for any lab work.  She has no chest pains no shortness of breath.  She is sleeping reasonably well with doxepin 10 mg p.o. Nightly.    I reviewed the following    Past Medical History:   Diagnosis Date   • Arrhythmia    • Bronchitis    • Depression    • Fall    • Hypertension    • Indigestion    • Renal disorder     stones   • Seizure (HCC)    • Stroke (HCC)     TIAs and CVA   • UTI (lower urinary tract infection) 7/18/2013        Past Surgical History:   Procedure Laterality Date   • EYE SURGERY  2007    bilateral cataracts   • COLONOSCOPY  2004    normal   • ABDOMINAL HYSTERECTOMY TOTAL  1974    benign disease   • CHOLECYSTECTOMY  1960   • LAPAROTOMY  1957    partial oophorectomy--benign disease       Allergies   Allergen Reactions   • Bupropion    • Cymbalta [Duloxetine Hcl] Nausea   • Darvocet [Propoxyphene N-Apap]      Dizziness     • Fluoxetine      suicidal thoughts      • Iodine    • Lexapro    • Lipitor [Atorvastatin Calcium]      muscle aches         Current Outpatient Prescriptions   Medication Sig Dispense Refill   • doxepin (SINEQUAN) 10 MG Cap Take 1 Cap by mouth every bedtime. 90 Cap 3   • benazepril (LOTENSIN) 5 MG Tab TAKE 1 TABLET BY MOUTH TWICE A  Tab 3   • simvastatin (ZOCOR) 40 MG Tab TAKE 1 TABLET BY MOUTH EVERY EVENING 90 Tab 3   • memantine (NAMENDA) 5 MG Tab Take 1 Tab by mouth 2 times a day. 180 Tab 3   • levETIRAcetam (KEPPRA) 500 MG Tab Take 1 Tab by mouth 2 times a day. 180 Tab 3   • aspirin EC (ECOTRIN) 81 MG Tablet Delayed Response Take 81 mg by mouth 2 Times a Day.     • vitamin D (CHOLECALCIFEROL) 1000 UNIT Tab Take 1,000 Units by mouth every day.       No current facility-administered medications for this visit.         Family History   Problem Relation Age of Onset   • Cancer Mother     • Heart Disease Mother    • Arthritis Mother         Lupus   • Heart Disease Father    • Cancer Brother    • Lung Disease Sister          from Lung Cancer at 70       Social History     Social History   • Marital status:      Spouse name: N/A   • Number of children: N/A   • Years of education: N/A     Occupational History   • Not on file.     Social History Main Topics   • Smoking status: Never Smoker   • Smokeless tobacco: Never Used   • Alcohol use Yes      Comment: rare   • Drug use: No   • Sexual activity: Yes     Partners: Male     Birth control/ protection: Post-Menopausal      Comment:      Other Topics Concern   • Not on file     Social History Narrative   • No narrative on file      Physical Exam   Constitutional: She is oriented. She appears well-developed and well-nourished. No distress.   HENT:  Head: Normocephalic and atraumatic.   Right Ear: External ear normal. Ear canal and TM normal   Left Ear: External ear normal. Ear canal and TM normal  Nose: Nose normal.   Mouth/Throat: Oropharynx is clear and moist.   Eyes: Conjunctivae and extraocular motions are normal. Pupils are equal, round, and reactive to light. Fundi benign bilaterally   Neck: No thyromegaly present.   Cardiovascular: Normal rate, irregular rhythm, normal heart sounds and intact distal pulses.  Exam reveals no gallop.    No murmur heard.  Pulmonary/Chest: Effort normal and breath sounds normal. No respiratory distress. She has no wheezes. She has no rales.   Abdominal: Soft. Bowel sounds are normal. No hepatosplenomegaly She exhibits no distension. No tenderness. She has no rebound and no guarding.   Musculoskeletal: Normal range of motion. She exhibits no edema and no tenderness.   Lymphadenopathy:     She has no cervical adenopathy.   No supraclavicular adenopathy  Neurological: She is alert and oriented. She has normal reflexes.        Babinskis downgoing bilaterally   Skin: Skin is warm and dry. No rash noted. No  erythema.   Psychiatric: She has a normal mood and appropriate affect. Her behavior is normal. Judgment and thought content normal.     1. Essential hypertension   controlled   2. Depression, unspecified depression type   improving   3. Edema, unspecified type   improving   4. Seizure cerebral   stable   5. Asymptomatic PVCs   stable     Please note that this dictation was created using voice recognition software. I have worked with consultants from the vendor as well as technical experts from [facility] to optimize the interface. I have made every reasonable attempt to correct obvious errors, but I expect that there are errors of grammar and possibly content that I did not discover before finalizing the note.    Recheck 2 months or as needed

## 2019-06-18 ENCOUNTER — OFFICE VISIT (OUTPATIENT)
Dept: MEDICAL GROUP | Facility: PHYSICIAN GROUP | Age: 84
End: 2019-06-18
Payer: MEDICARE

## 2019-06-18 VITALS
BODY MASS INDEX: 26.21 KG/M2 | OXYGEN SATURATION: 93 % | WEIGHT: 130 LBS | HEART RATE: 62 BPM | SYSTOLIC BLOOD PRESSURE: 142 MMHG | HEIGHT: 59 IN | DIASTOLIC BLOOD PRESSURE: 68 MMHG | TEMPERATURE: 96.7 F | RESPIRATION RATE: 18 BRPM

## 2019-06-18 DIAGNOSIS — I10 ESSENTIAL HYPERTENSION: ICD-10-CM

## 2019-06-18 DIAGNOSIS — G40.909 SEIZURE CEREBRAL (HCC): ICD-10-CM

## 2019-06-18 DIAGNOSIS — F41.9 ANXIETY: ICD-10-CM

## 2019-06-18 DIAGNOSIS — I63.333 CEREBROVASCULAR ACCIDENT (CVA) DUE TO BILATERAL THROMBOSIS OF POSTERIOR CEREBRAL ARTERIES (HCC): ICD-10-CM

## 2019-06-18 DIAGNOSIS — F32.A DEPRESSION, UNSPECIFIED DEPRESSION TYPE: ICD-10-CM

## 2019-06-18 PROCEDURE — 99214 OFFICE O/P EST MOD 30 MIN: CPT | Performed by: FAMILY MEDICINE

## 2019-06-19 NOTE — PROGRESS NOTES
"Patient comes in.  She says she is stable.  She feels that her depression is getting somewhat better.  Her neurologist Dr. Jensen recently retired and she needs to be referred to another neurologist.  She has a past history of a CVA and has had expressive dysphasia.  The dysphagia has actually gotten better.  She has no chest pains and no shortness of breath.  She has had no seizures or \"spells\".    I reviewed the following    Past Medical History:   Diagnosis Date   • Arrhythmia    • Bronchitis    • Depression    • Fall    • Hypertension    • Indigestion    • Renal disorder     stones   • Seizure (HCC)    • Stroke (HCC)     TIAs and CVA   • UTI (lower urinary tract infection) 7/18/2013        Past Surgical History:   Procedure Laterality Date   • EYE SURGERY  2007    bilateral cataracts   • COLONOSCOPY  2004    normal   • ABDOMINAL HYSTERECTOMY TOTAL  1974    benign disease   • CHOLECYSTECTOMY  1960   • LAPAROTOMY  1957    partial oophorectomy--benign disease       Allergies   Allergen Reactions   • Bupropion    • Cymbalta [Duloxetine Hcl] Nausea   • Darvocet [Propoxyphene N-Apap]      Dizziness     • Fluoxetine      suicidal thoughts      • Iodine    • Lexapro    • Lipitor [Atorvastatin Calcium]      muscle aches         Current Outpatient Prescriptions   Medication Sig Dispense Refill   • doxepin (SINEQUAN) 10 MG Cap Take 1 Cap by mouth every bedtime. 90 Cap 3   • benazepril (LOTENSIN) 5 MG Tab TAKE 1 TABLET BY MOUTH TWICE A  Tab 3   • simvastatin (ZOCOR) 40 MG Tab TAKE 1 TABLET BY MOUTH EVERY EVENING 90 Tab 3   • memantine (NAMENDA) 5 MG Tab Take 1 Tab by mouth 2 times a day. 180 Tab 3   • levETIRAcetam (KEPPRA) 500 MG Tab Take 1 Tab by mouth 2 times a day. 180 Tab 3   • aspirin EC (ECOTRIN) 81 MG Tablet Delayed Response Take 81 mg by mouth 2 Times a Day.     • vitamin D (CHOLECALCIFEROL) 1000 UNIT Tab Take 1,000 Units by mouth every day.       No current facility-administered medications for this visit. "         Family History   Problem Relation Age of Onset   • Cancer Mother    • Heart Disease Mother    • Arthritis Mother         Lupus   • Heart Disease Father    • Cancer Brother    • Lung Disease Sister          from Lung Cancer at 70       Social History     Social History   • Marital status:      Spouse name: N/A   • Number of children: N/A   • Years of education: N/A     Occupational History   • Not on file.     Social History Main Topics   • Smoking status: Never Smoker   • Smokeless tobacco: Never Used   • Alcohol use Yes      Comment: rare   • Drug use: No   • Sexual activity: Yes     Partners: Male     Birth control/ protection: Post-Menopausal      Comment:      Other Topics Concern   • Not on file     Social History Narrative   • No narrative on file      Physical Exam   Constitutional: She is oriented. She appears well-developed and well-nourished. No distress.  Her speech actually sounds good today  HENT:  Head: Normocephalic and atraumatic.   Right Ear: External ear normal. Ear canal and TM normal   Left Ear: External ear normal. Ear canal and TM normal  Nose: Nose normal.   Mouth/Throat: Oropharynx is clear and moist.   Eyes: Conjunctivae and extraocular motions are normal. Pupils are equal, round, and reactive to light. Fundi benign bilaterally   Neck: No thyromegaly present.   Cardiovascular: Normal rate, regular rhythm, normal heart sounds and intact distal pulses.  Exam reveals no gallop.    No murmur heard.  Pulmonary/Chest: Effort normal and breath sounds normal. No respiratory distress. She has no wheezes. She has no rales.   Abdominal: Soft. Bowel sounds are normal. No hepatosplenomegaly She exhibits no distension. No tenderness. She has no rebound and no guarding.   Musculoskeletal: Normal range of motion. She exhibits no edema and no tenderness.   Lymphadenopathy:     She has no cervical adenopathy.   No supraclavicular adenopathy  Neurological: She is alert and oriented. She  has normal reflexes.        Babinskis downgoing bilaterally   Skin: Skin is warm and dry. No rash noted. No erythema.   Psychiatric: She has a normal mood and appropriate affect. Her behavior is normal. Judgment and thought content normal.     1. Essential hypertension   controlled   2. Anxiety   stable   3. Depression, unspecified depression type   stable   4. Cerebrovascular accident (CVA) due to bilateral thrombosis of posterior cerebral arteries (HCC)  REFERRAL TO NEUROLOGY--Dr. Boyd.  I told the patient and her family that he was an excellent neurologist and there very well could be a several month wait to get in to see him.  They are willing to do this.   5. Seizure cerebral   no recurrence     Please note that this dictation was created using voice recognition software. I have worked with consultants from the vendor as well as technical experts from [facility] [employer] to optimize the interface. I have made every reasonable attempt to correct obvious errors, but I expect that there are errors of grammar and possibly content that I did not discover before finalizing the note.    Recheck 3 months or as needed

## 2019-09-17 ENCOUNTER — OFFICE VISIT (OUTPATIENT)
Dept: MEDICAL GROUP | Facility: PHYSICIAN GROUP | Age: 84
End: 2019-09-17
Payer: MEDICARE

## 2019-09-17 VITALS
WEIGHT: 130 LBS | SYSTOLIC BLOOD PRESSURE: 140 MMHG | BODY MASS INDEX: 26.21 KG/M2 | RESPIRATION RATE: 18 BRPM | HEART RATE: 67 BPM | HEIGHT: 59 IN | TEMPERATURE: 97.3 F | OXYGEN SATURATION: 97 % | DIASTOLIC BLOOD PRESSURE: 64 MMHG

## 2019-09-17 DIAGNOSIS — G40.909 SEIZURE CEREBRAL (HCC): ICD-10-CM

## 2019-09-17 DIAGNOSIS — I10 ESSENTIAL HYPERTENSION: ICD-10-CM

## 2019-09-17 DIAGNOSIS — I63.333 CEREBROVASCULAR ACCIDENT (CVA) DUE TO BILATERAL THROMBOSIS OF POSTERIOR CEREBRAL ARTERIES (HCC): ICD-10-CM

## 2019-09-17 DIAGNOSIS — R47.02 EXPRESSIVE DYSPHASIA: ICD-10-CM

## 2019-09-17 DIAGNOSIS — R55 SYNCOPE, UNSPECIFIED SYNCOPE TYPE: ICD-10-CM

## 2019-09-17 DIAGNOSIS — F43.21 GRIEF: ICD-10-CM

## 2019-09-17 DIAGNOSIS — K22.2 ESOPHAGEAL STRICTURE: ICD-10-CM

## 2019-09-17 DIAGNOSIS — R41.3 MEMORY DIFFICULTIES: ICD-10-CM

## 2019-09-17 DIAGNOSIS — E78.2 MIXED HYPERLIPIDEMIA: ICD-10-CM

## 2019-09-17 PROCEDURE — 99214 OFFICE O/P EST MOD 30 MIN: CPT | Performed by: FAMILY MEDICINE

## 2019-09-17 RX ORDER — SIMVASTATIN 40 MG
TABLET ORAL
Qty: 90 TAB | Refills: 3 | Status: SHIPPED | OUTPATIENT
Start: 2019-09-17 | End: 2020-10-26 | Stop reason: SDUPTHER

## 2019-09-17 RX ORDER — MEMANTINE HYDROCHLORIDE 5 MG/1
5 TABLET ORAL 2 TIMES DAILY
Qty: 180 TAB | Refills: 3 | Status: SHIPPED | OUTPATIENT
Start: 2019-09-17 | End: 2020-03-11 | Stop reason: SDUPTHER

## 2019-09-17 RX ORDER — LEVETIRACETAM 500 MG/1
500 TABLET ORAL 2 TIMES DAILY
Qty: 180 TAB | Refills: 3 | Status: SHIPPED | OUTPATIENT
Start: 2019-09-17 | End: 2020-02-10

## 2019-09-18 ENCOUNTER — PATIENT OUTREACH (OUTPATIENT)
Dept: HEALTH INFORMATION MANAGEMENT | Facility: OTHER | Age: 84
End: 2019-09-18

## 2019-09-18 NOTE — PROGRESS NOTES
Patient comes in for follow-up.  She has had no more falls.  She had no more spells.  She has not followed up with her referral to Dr. Weir.  I am printed this referral and she and her family will proceed with getting her in to see him.  She does have a history of CVA as well as seizures and I think she needs to have a neurologist.  Her speech has been doing well.  Things do not get stuck when she swallows at this point.  She still misses her late .  She and her family are thinking about getting her into an assisted living facility but they need help with his  for advice about how to afford this.    I reviewed the following    Past Medical History:   Diagnosis Date   • Arrhythmia    • Bronchitis    • Depression    • Fall    • Hypertension    • Indigestion    • Renal disorder     stones   • Seizure (HCC)    • Stroke (HCC)     TIAs and CVA   • UTI (lower urinary tract infection) 7/18/2013        Past Surgical History:   Procedure Laterality Date   • EYE SURGERY  2007    bilateral cataracts   • COLONOSCOPY  2004    normal   • ABDOMINAL HYSTERECTOMY TOTAL  1974    benign disease   • CHOLECYSTECTOMY  1960   • LAPAROTOMY  1957    partial oophorectomy--benign disease       Allergies   Allergen Reactions   • Bupropion    • Cymbalta [Duloxetine Hcl] Nausea   • Darvocet [Propoxyphene N-Apap]      Dizziness     • Fluoxetine      suicidal thoughts      • Iodine    • Lexapro    • Lipitor [Atorvastatin Calcium]      muscle aches         Current Outpatient Medications   Medication Sig Dispense Refill   • simvastatin (ZOCOR) 40 MG Tab TAKE 1 TABLET BY MOUTH EVERY EVENING 90 Tab 3   • memantine (NAMENDA) 5 MG Tab Take 1 Tab by mouth 2 times a day. 180 Tab 3   • levETIRAcetam (KEPPRA) 500 MG Tab Take 1 Tab by mouth 2 times a day. 180 Tab 3   • doxepin (SINEQUAN) 10 MG Cap Take 1 Cap by mouth every bedtime. 90 Cap 3   • benazepril (LOTENSIN) 5 MG Tab TAKE 1 TABLET BY MOUTH TWICE A  Tab 3   • aspirin EC  (ECOTRIN) 81 MG Tablet Delayed Response Take 81 mg by mouth 2 Times a Day.     • vitamin D (CHOLECALCIFEROL) 1000 UNIT Tab Take 1,000 Units by mouth every day.       No current facility-administered medications for this visit.         Family History   Problem Relation Age of Onset   • Cancer Mother    • Heart Disease Mother    • Arthritis Mother         Lupus   • Heart Disease Father    • Cancer Brother    • Lung Disease Sister          from Lung Cancer at 70       Social History     Socioeconomic History   • Marital status:      Spouse name: Not on file   • Number of children: Not on file   • Years of education: Not on file   • Highest education level: Not on file   Occupational History   • Not on file   Social Needs   • Financial resource strain: Not on file   • Food insecurity:     Worry: Not on file     Inability: Not on file   • Transportation needs:     Medical: Not on file     Non-medical: Not on file   Tobacco Use   • Smoking status: Never Smoker   • Smokeless tobacco: Never Used   Substance and Sexual Activity   • Alcohol use: Yes     Comment: rare   • Drug use: No   • Sexual activity: Yes     Partners: Male     Birth control/protection: Post-Menopausal     Comment:    Lifestyle   • Physical activity:     Days per week: Not on file     Minutes per session: Not on file   • Stress: Not on file   Relationships   • Social connections:     Talks on phone: Not on file     Gets together: Not on file     Attends Oriental orthodox service: Not on file     Active member of club or organization: Not on file     Attends meetings of clubs or organizations: Not on file     Relationship status: Not on file   • Intimate partner violence:     Fear of current or ex partner: Not on file     Emotionally abused: Not on file     Physically abused: Not on file     Forced sexual activity: Not on file   Other Topics Concern   • Not on file   Social History Narrative   • Not on file      Physical Exam   Constitutional: She  is oriented. She appears well-developed and well-nourished. No distress.   HENT:  Head: Normocephalic and atraumatic.   Right Ear: External ear normal. Ear canal and TM normal   Left Ear: External ear normal. Ear canal and TM normal  Nose: Nose normal.   Mouth/Throat: Oropharynx is clear and moist.   Eyes: Conjunctivae and extraocular motions are normal. Pupils are equal, round, and reactive to light. Fundi benign bilaterally   Neck: No thyromegaly present.   Cardiovascular: Normal rate, regular rhythm, normal heart sounds and intact distal pulses.  Exam reveals no gallop.    No murmur heard.  Pulmonary/Chest: Effort normal and breath sounds normal. No respiratory distress. She has no wheezes. She has no rales.   Abdominal: Soft. Bowel sounds are normal. No hepatosplenomegaly She exhibits no distension. No tenderness. She has no rebound and no guarding.   Musculoskeletal: Normal range of motion. She exhibits no edema and no tenderness.   Lymphadenopathy:     She has no cervical adenopathy.   No supraclavicular adenopathy  Neurological: She is alert and oriented. She has normal reflexes.        Babinskis downgoing bilaterally   Skin: Skin is warm and dry. No rash noted. No erythema.   Psychiatric: She has a normal mood and appropriate affect. Her behavior is normal. Judgment and thought content normal.     1. Cerebrovascular accident (CVA) due to bilateral thrombosis of posterior cerebral arteries (HCC) --stable on REFERRAL TO COMPLEX CARE MANAGEMENT Services Requested: Care Manager to Evaluate and Recommend   2. Esophageal stricture--doing well REFERRAL TO COMPLEX CARE MANAGEMENT Services Requested: Care Manager to Evaluate and Recommend   3. Expressive dysphasia --stable REFERRAL TO COMPLEX CARE MANAGEMENT Services Requested: Care Manager to Evaluate and Recommend   4. Essential hypertension --controlled REFERRAL TO COMPLEX CARE MANAGEMENT Services Requested: Care Manager to Evaluate and Recommend   5. Seizure  cerebral --controlled REFERRAL TO COMPLEX CARE MANAGEMENT Services Requested: Care Manager to Evaluate and Recommend    levETIRAcetam (KEPPRA) 500 MG Tab   6. Grief --ongoing REFERRAL TO COMPLEX CARE MANAGEMENT Services Requested: Care Manager to Evaluate and Recommend   7. Memory difficulties  memantine (NAMENDA) 5 MG Tab--refill   8. Syncope, unspecified syncope type --no recurrences levETIRAcetam (KEPPRA) 500 MG Tab   9. Mixed hyperlipidemia  simvastatin (ZOCOR) 40 MG Tab--refill     Please note that this dictation was created using voice recognition software. I have worked with consultants from the vendor as well as technical experts from [facility] [employer] to optimize the interface. I have made every reasonable attempt to correct obvious errors, but I expect that there are errors of grammar and possibly content that I did not discover before finalizing the note.    Recheck 2 months or as needed

## 2019-09-18 NOTE — PROGRESS NOTES
Referral received from PCP-MD, specifying social need of HLC and grief/loss. TC to pt to engage, assess and establish care plan. Pt educated on role of SW and reason for referral. Pt confirms interest HLC and requests SW to speak with daughter-in-law Emerita. Pt gave verbal permission for SW to communicate medical and financial information with daughter-in-law.     SW furthered conversation surrounding grief/loss. Pt reports coping with grief/loss and feeling supported by kin. Pt has experienced prior death (death of 11 year old daughter). She declines resources at this time but is agreeable to request SW contact if need arises.    TC to kin. VM(10 left requesting return TC.    Plan:

## 2019-09-20 ENCOUNTER — PATIENT OUTREACH (OUTPATIENT)
Dept: HEALTH INFORMATION MANAGEMENT | Facility: OTHER | Age: 84
End: 2019-09-20

## 2019-10-04 ENCOUNTER — PATIENT OUTREACH (OUTPATIENT)
Dept: MEDICAL GROUP | Facility: PHYSICIAN GROUP | Age: 84
End: 2019-10-04

## 2019-10-04 NOTE — PROGRESS NOTES
TC to Emerita. Individual answering denies anyone by that name and confirmed phone number.    TC to pt. VM(1) left requesting return TC.    Return TC from Emerita who confirmed nubmer (number updated in chart). Emerita states pt is ambivalent towards transitioning to [facility]. LA furthered discussion and educated Emerita on residential placements, group homes and group home waivers. Pt's current income aprox $2,800/mo placing her over income for ADSD waiver programs. SW recommended having a family discussion regarding cost then touring residential placements within financial limits. Additionally, kin/pt may benefit from contacting group [facility] for tours and pricing.     SW provided contact information to kin and recommends re-establishing with this SW if future social needs arise. Kin felt confident in contacting this SW as needed.      Plan:  SW will not actively follow pt at this time.  Social need met at time of contact.  TC to pt at later time/date

## 2019-11-19 ENCOUNTER — OFFICE VISIT (OUTPATIENT)
Dept: MEDICAL GROUP | Facility: PHYSICIAN GROUP | Age: 84
End: 2019-11-19
Payer: MEDICARE

## 2019-11-19 VITALS
BODY MASS INDEX: 26.41 KG/M2 | SYSTOLIC BLOOD PRESSURE: 132 MMHG | OXYGEN SATURATION: 94 % | HEART RATE: 74 BPM | RESPIRATION RATE: 16 BRPM | TEMPERATURE: 97.6 F | WEIGHT: 131 LBS | HEIGHT: 59 IN | DIASTOLIC BLOOD PRESSURE: 70 MMHG

## 2019-11-19 DIAGNOSIS — I63.333 CEREBROVASCULAR ACCIDENT (CVA) DUE TO BILATERAL THROMBOSIS OF POSTERIOR CEREBRAL ARTERIES (HCC): ICD-10-CM

## 2019-11-19 DIAGNOSIS — K21.00 GASTROESOPHAGEAL REFLUX DISEASE WITH ESOPHAGITIS: ICD-10-CM

## 2019-11-19 DIAGNOSIS — H53.9 CHANGE IN VISION: ICD-10-CM

## 2019-11-19 DIAGNOSIS — I10 ESSENTIAL HYPERTENSION: ICD-10-CM

## 2019-11-19 DIAGNOSIS — F03.90 DEMENTIA WITHOUT BEHAVIORAL DISTURBANCE, UNSPECIFIED DEMENTIA TYPE: ICD-10-CM

## 2019-11-19 PROCEDURE — 99214 OFFICE O/P EST MOD 30 MIN: CPT | Performed by: FAMILY MEDICINE

## 2019-11-19 NOTE — PATIENT INSTRUCTIONS
Patient and her daughter-in-law given written instructions regarding labs, imaging, medications, referrals, dietary and lifestyle management, and return visit.    Yoav Wakefield MD

## 2020-02-07 ENCOUNTER — OFFICE VISIT (OUTPATIENT)
Dept: MEDICAL GROUP | Facility: PHYSICIAN GROUP | Age: 85
End: 2020-02-07
Payer: MEDICARE

## 2020-02-07 VITALS
HEIGHT: 59 IN | DIASTOLIC BLOOD PRESSURE: 82 MMHG | TEMPERATURE: 98.1 F | BODY MASS INDEX: 26.21 KG/M2 | OXYGEN SATURATION: 99 % | HEART RATE: 81 BPM | WEIGHT: 130 LBS | SYSTOLIC BLOOD PRESSURE: 148 MMHG

## 2020-02-07 DIAGNOSIS — F02.818 DEMENTIA ASSOCIATED WITH OTHER UNDERLYING DISEASE WITH BEHAVIORAL DISTURBANCE (HCC): ICD-10-CM

## 2020-02-07 DIAGNOSIS — G40.909 SEIZURE DISORDER (HCC): ICD-10-CM

## 2020-02-07 DIAGNOSIS — Z86.73 HISTORY OF CVA (CEREBROVASCULAR ACCIDENT): ICD-10-CM

## 2020-02-07 PROBLEM — Z09 HOSPITAL DISCHARGE FOLLOW-UP: Status: RESOLVED | Noted: 2018-10-16 | Resolved: 2020-02-07

## 2020-02-07 PROBLEM — I63.333 CEREBROVASCULAR ACCIDENT (CVA) DUE TO BILATERAL THROMBOSIS OF POSTERIOR CEREBRAL ARTERIES (HCC): Status: RESOLVED | Noted: 2017-05-23 | Resolved: 2020-02-07

## 2020-02-07 PROBLEM — F03.918 DEMENTIA WITH BEHAVIORAL DISTURBANCE (HCC): Status: ACTIVE | Noted: 2018-10-30

## 2020-02-07 PROCEDURE — 99214 OFFICE O/P EST MOD 30 MIN: CPT | Performed by: FAMILY MEDICINE

## 2020-02-07 NOTE — PROGRESS NOTES
Chief Complaint   Patient presents with   • Dementia     Multiple medical problems including a recent bout of memory changes and paranoia.    HISTORY OF PRESENT ILLNESS: Patient is a 84 y.o. female established patient who presents today for the following.      1. History of CVA (cerebrovascular accident)  Patient has a history of a CVA.  She has had no symptoms consistent with a repeat CVA.  No focal neurological deficits.    2. Dementia associated with other underlying disease with behavioral disturbance (HCC)  Patient has a history of dementia.  Over the past 3 to 4 days patient had a worsening of her symptoms with some degree of paranoia and acute memory loss.  Patient has had spells of this in the past that had not quite lasted as long.  Per the patient's daughter and son-in-law, this 1 was somewhat extended and hence they made the appointment.  She has improved back to her baseline at present.  Patient is very teary when talking about the passing of her ex-.  Patient does appear to have some symptoms of depression, and we discussed that this could be contributing to some of her memory and behavioral changes.    3. Seizure disorder ([facility])  Patient is taking her medication as prescribed.  No recent seizure activity.      No problem-specific Assessment & Plan notes found for this encounter.      She  has a past medical history of Arrhythmia, Bronchitis, Depression, Fall, Hypertension, Indigestion, Renal disorder, Seizure ([facility]), Stroke ([facility]), and UTI (lower urinary tract infection) (7/18/2013).    Patient Active Problem List    Diagnosis Date Noted   • History of CVA (cerebrovascular accident) 02/07/2020   • Change in vision 11/19/2019   • Dementia with behavioral disturbance ([facility]) 10/30/2018   • Seizure disorder ([facility]) 10/30/2018   • Expressive dysphasia 05/30/2018   • Vitamin deficiency 11/11/2016   • Asymptomatic PVCs 06/20/2016   • Grief 05/16/2016   • Meningioma ([facility]) 07/30/2015   • JINNY (obstructive sleep  "apnea) 2012   • Obesity 2012   • Anxiety 05/10/2012   • Esophageal stricture 2012   • Spinal stenosis, lumbar 10/22/2009   • Hypertension 2009   • Sarcoidosis 2009   • Depression 2009   • Nephrolithiasis 2009   • Edema 2009   • GERD (gastroesophageal reflux disease) 2009       Allergies:Bupropion; Cymbalta [duloxetine hcl]; Darvocet [propoxyphene n-apap]; Fluoxetine; Iodine; Lexapro; and Lipitor [atorvastatin calcium]    Current Outpatient Medications   Medication Sig Dispense Refill   • simvastatin (ZOCOR) 40 MG Tab TAKE 1 TABLET BY MOUTH EVERY EVENING 90 Tab 3   • memantine (NAMENDA) 5 MG Tab Take 1 Tab by mouth 2 times a day. 180 Tab 3   • levETIRAcetam (KEPPRA) 500 MG Tab Take 1 Tab by mouth 2 times a day. 180 Tab 3   • doxepin (SINEQUAN) 10 MG Cap Take 1 Cap by mouth every bedtime. 90 Cap 3   • benazepril (LOTENSIN) 5 MG Tab TAKE 1 TABLET BY MOUTH TWICE A  Tab 3   • aspirin EC (ECOTRIN) 81 MG Tablet Delayed Response Take 81 mg by mouth 2 Times a Day.     • vitamin D (CHOLECALCIFEROL) 1000 UNIT Tab Take 1,000 Units by mouth every day.       No current facility-administered medications for this visit.        Social History     Tobacco Use   • Smoking status: Never Smoker   • Smokeless tobacco: Never Used   Substance Use Topics   • Alcohol use: Yes     Comment: rare   • Drug use: No       Family History   Problem Relation Age of Onset   • Cancer Mother    • Heart Disease Mother    • Arthritis Mother         Lupus   • Heart Disease Father    • Cancer Brother    • Lung Disease Sister          from Lung Cancer at 70       Health Maintenance:      Review of Systems   No fever, chills, nausea, vomiting, diarrhea, chest pain or shortness of breath.  See HPI    Exam:  /82 (BP Location: Right arm, Patient Position: Sitting, BP Cuff Size: Adult)   Pulse 81   Temp 36.7 °C (98.1 °F) (Temporal)   Ht 1.499 m (4' 11\")   Wt 59 kg (130 lb)   SpO2 99%  " Body mass index is 26.26 kg/m².  Constitutional:  NAD, well appearing.  Teary when speaking about her   HEENT:   NC/AT, PERRLA, EOMI, OP clear, no lymphadenopathy, no thyromegaly.    Cardiovascular: RRR.   No m/r/g. No carotid bruits.       Lungs:   CTAB, no w/r/r, no respiratory distress.  Abdomen: Soft, NT/ND + BS, no masses, no suprapubic tenderness, no hepatomegaly.  Extremities:  2+ DP and radial pulses bilaterally.  No c/c/e.  Skin:  Warm and dry.    Neurologic: Alert & oriented x 3, CN II-XII grossly intact, strength and sensation grossly intact.  No focal deficits noted.  Psychiatric:  Affect normal, mood normal, judgment normal.    Assessment/Plan:     1. History of CVA (cerebrovascular accident)  History of the same.  No apparent new symptoms although some consideration for this and vascular dementia for her waxing and waning symptoms.    2. 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  Patient has a baseline dementia.  She has been on memantine and when she was on the 10 mg dose felt like she was a zombie.  They did not wish to go up on the dose at present.  We did discuss that her primary care provider is leaving and she will need a new primary care provider.  Discussed establishing with Dr. Meadows to help with her dementia.  Appointment made for the patient.  Greatly appreciate the assistance with this patient's care.    3. Seizure disorder (HCC)  Appears to be stable continue Keppra.  No recent seizure-like activity.  Monitor        Followup: No follow-ups on file.    Please note that this dictation was created using voice recognition software. I have made every reasonable attempt to correct obvious errors, but I expect that there are errors of grammar and possibly content that I did not discover before finalizing the  note.

## 2020-02-10 ENCOUNTER — OFFICE VISIT (OUTPATIENT)
Dept: MEDICAL GROUP | Facility: MEDICAL CENTER | Age: 85
End: 2020-02-10
Payer: MEDICARE

## 2020-02-10 VITALS
HEART RATE: 64 BPM | WEIGHT: 128.97 LBS | DIASTOLIC BLOOD PRESSURE: 70 MMHG | TEMPERATURE: 98.7 F | SYSTOLIC BLOOD PRESSURE: 140 MMHG | OXYGEN SATURATION: 97 % | HEIGHT: 59 IN | BODY MASS INDEX: 26 KG/M2

## 2020-02-10 DIAGNOSIS — F43.21 GRIEF: ICD-10-CM

## 2020-02-10 DIAGNOSIS — I10 ESSENTIAL HYPERTENSION: ICD-10-CM

## 2020-02-10 DIAGNOSIS — D32.9 MENINGIOMA (HCC): ICD-10-CM

## 2020-02-10 DIAGNOSIS — Z86.73 HISTORY OF CVA (CEREBROVASCULAR ACCIDENT): ICD-10-CM

## 2020-02-10 DIAGNOSIS — K21.9 GERD WITH STRICTURE: ICD-10-CM

## 2020-02-10 DIAGNOSIS — F02.818 DEMENTIA ASSOCIATED WITH OTHER UNDERLYING DISEASE WITH BEHAVIORAL DISTURBANCE (HCC): ICD-10-CM

## 2020-02-10 DIAGNOSIS — F33.1 MODERATE EPISODE OF RECURRENT MAJOR DEPRESSIVE DISORDER (HCC): ICD-10-CM

## 2020-02-10 DIAGNOSIS — K22.4 ESOPHAGEAL MOTILITY DISORDER: ICD-10-CM

## 2020-02-10 DIAGNOSIS — G40.909 SEIZURE DISORDER (HCC): ICD-10-CM

## 2020-02-10 DIAGNOSIS — E55.9 VITAMIN D DEFICIENCY: ICD-10-CM

## 2020-02-10 DIAGNOSIS — G47.33 OSA (OBSTRUCTIVE SLEEP APNEA): ICD-10-CM

## 2020-02-10 DIAGNOSIS — N18.30 CHRONIC KIDNEY DISEASE, STAGE 3: ICD-10-CM

## 2020-02-10 DIAGNOSIS — E53.9 VITAMIN B DEFICIENCY: ICD-10-CM

## 2020-02-10 DIAGNOSIS — K22.2 GERD WITH STRICTURE: ICD-10-CM

## 2020-02-10 PROBLEM — R47.02 EXPRESSIVE DYSPHASIA: Status: RESOLVED | Noted: 2018-05-30 | Resolved: 2020-02-10

## 2020-02-10 PROCEDURE — 99205 OFFICE O/P NEW HI 60 MIN: CPT | Performed by: INTERNAL MEDICINE

## 2020-02-11 PROBLEM — E55.9 VITAMIN D DEFICIENCY: Status: ACTIVE | Noted: 2020-02-11

## 2020-02-11 NOTE — PROGRESS NOTES
Subjective:     CC:  Diagnoses of History of CVA (cerebrovascular accident), Dementia associated with other underlying disease with behavioral disturbance (HCC), Essential hypertension, JINNY (obstructive sleep apnea), Meningioma (HCC), Moderate episode of recurrent major depressive disorder (HCC), Esophageal motility disorder, GERD with stricture, Grief, Vitamin B deficiency, Vitamin D deficiency, and Seizure disorder (HCC) were pertinent to this visit.    HISTORY OF THE PRESENT ILLNESS: Patient is a 85 y.o. female. This pleasant patient is here today to establish care and discuss the below stated chronic medical conditions. She is accompanied by her son, Jamaal, and daughter, Judah.    Problem   Vitamin D Deficiency       Ref. Range 11/8/2016 08:23   25-Hydroxy   Vitamin D 25 Latest Ref Range: 30 - 100 ng/mL 28 (L)     She has a history of vitamin D deficiency.  She is currently taking 1000 international units daily.  On last check in 2016 she continued to remain deficient.  I do not see a bone density on review of her chart so unclear to me if she is osteopenic or osteoporotic.     History of Cva (Cerebrovascular Accident)    She has a history of stroke in her 50s or 60s.  The stroke included both posterior cerebral arteries.  She did not have any paralysis and at this point does not have any sequelae.  The predominant symptom was aphasia/dysarthria which she was able to recover through intensive rehab.  No known subsequent strokes.  She is on low-dose aspirin as well as statin therapy.     Dementia with behavioral disturbance (HCC) FAST stage 4    The patient and her children share with me that her cognitive changes started around age 81.  It was gradual in its presentation and evidenced by inappropriate comments from the patient.  Since that time the patient has had challenges with routine tasks that previously did not get for any issues.  For example she no longer can use a microwave, stove, or oven on her own.   "She can sometimes figure out how to use a washer and dryer to do laundry.  In the past year especially they have noticed a significant decline.  The patient has stopped driving since 2017 and understands that this would not be safe for her.  She was started on Namenda 5 mg twice daily around 2018 by Dr. Shepard.  Unclear if the patient is still taking this medication.  She is able to complete her ADLs at this time but requires assistance with all instrumental ADLs.  Her daughter and son both live with her right now and are providing 24/7 care.  The goal is to keep her in the home.      The patient has also had 4 episodes of altered mental status in the past year.  Previously these were fleeting would last a couple hours.  The family describes them as her acting \"out of sorts \".  Due to inappropriate behavior have more mood lability.  Some of these have occurred around dusk.  She had her most significant episode in the first week of February 2020 with this behavior lasted for 3 days.  Patient had very irritable mood and an episode of wandering behavior that the daughter witnessed as the patient was walking around the neighborhood trying to see her neighbors.  Her family shares with me that she has a very close knit group of friends and they come and visit her several times per week and also call her, this helps her to remain social.  Patient does not have many hobbies at this time and does not read or follow TV programs as she cannot follow the plot at this point.     Seizure Disorder (Hcc)    She has seen both Dr. Diaz and Dr. Greene of neurology for this complaint.     Her seizures have been characterized as syncopal episodes, these have occurred in 2012, 2015, and 2018. She was placed on levetiracetam 500 mg twice daily. Family does not think the patient is taking this medication anymore, unsure when she stopped. They plan to check her bottles at home and let me know. No further seizure episodes since 2018. Not " currently following up with neurology at this time.     Vitamin B Deficiency       Ref. Range 2018 10:51   Vitamin B1 Latest Ref Range: 70 - 180 nmol/L 55 (L)   Vitamin B12 -True Cobalamin Latest Ref Range: 211 - 911 pg/mL 295   Vitamin B6 Latest Ref Range: 20.0 - 125.0 nmol/L 23.7     She is found to have vitamin B1 deficiency and has had borderline/low B12 deficiency in the past.  Does not appear she is taking any vitamin supplementation at this time.     Grief    Patient and her family note she experienced significant grief after the loss of her longtime , Adonis.  He was on hospice before he passed.  He had severe and advanced dementia.  She continues to be tearful when she discusses him at doctor visits and around her family.  Her kids also mention that since her  has passed she has expressed a lot of feelings of guilt and also has brought up a previous child who  at age 11 who was born with several congenital conditions.  Her son said that prior to her  dying she brought up this topic only rarely.  She also states that she is raised Bahai and has not been to Christian lately as they do not include her with volunteering activities and feels that if she would go to Christian more often perhaps these bad things would not keep happening to her.     Meningioma (HCC)    Incidental finding on head CT in 2015.  Located in the left frontal area and approximately 1 cm, partially calcified no associated mass-effect.  Patient has had syncopal episodes in the past that were felt to be seizure activity.  She also has a prior history of stroke.  She was on anticonvulsants for period of time but her family shares that she stopped taking these.  Unclear if the prior seizure activity was related to the meningioma or not.  Follow-up head CT in  redemonstrated a 1 cm left frontal meningioma.     Yoan (Obstructive Sleep Apnea)    Probable YOAN documented in her notes but it does not appear she is  met with sleep medicine or is using any sort of device.  Will discuss with her further at our follow-up visit in April and if she or her family are interested we can complete a formal sleep study due to her advancing dementia I suspect this would be white intrusive for her sleep.     Esophageal Motility Disorder    She has a history of dysphagia and nonspecific motility disorder and was [facility] Cancino.  She completed an esophageal motility test that showed elevated LES pressures of 62 mmHg over a normal 45 mmHg incomplete relaxation of the LES with swallowing.  He was offered diltiazem 3 times daily or gastroscopy with LES Botox injections.  She completed EGD and colonoscopy in May 2009 and follow-up EGD in March 2012 with esophageal dilation.  Patient declined pursuing any therapy at her last visit in 2012.       Hypertension       Ref. Range 10/6/2018 06:11   Bun Latest Ref Range: 8 - 22 mg/dL 14   Creatinine Latest Ref Range: 0.50 - 1.40 mg/dL 0.83   GFR If Non  Latest Ref Range: >60 mL/min/1.73 m 2 >60     She has longstanding history of hypertension.  Last several readings have ranged from 132-148/70-82.  She is currently taking benazepril 5 mg daily.  No signs or symptoms of orthostasis.  No chest pain, palpitations, or dyspnea on exertion.  She does not check her blood pressure regularly.  No recent metabolic panel.     Depression    Family reports a history of depression.  She is taking a prescription of doxepin at bedtime to assist with sleep as well as anxiety and low mood.  Unclear if she is ever taken formal antidepressants.  She did have a significant amount of grief after the passing of her  3 years ago.  She cries easily and has a lot of mood lability.  Patient and family would be interested in consideration of adding a prescription antidepressant.     Gerd With Stricture    She has a history of acid reflux disease with stricture in  the esophagus.  This required dilation back in March 2012.  She is working with [facility] at that time, Dr. Cancino.  She is not currently taking any PPI or H2 blockers.  Continues to have some dysphagia but no overt heartburn symptoms.  No odynophagia.  No hematemesis.       Allergies: Bupropion; Cymbalta [duloxetine hcl]; Darvocet [propoxyphene n-apap]; Fluoxetine; Iodine; Lexapro; and Lipitor [atorvastatin calcium]    Current Outpatient Medications Ordered in Epic   Medication Sig Dispense Refill   • simvastatin (ZOCOR) 40 MG Tab TAKE 1 TABLET BY MOUTH EVERY EVENING 90 Tab 3   • doxepin (SINEQUAN) 10 MG Cap Take 1 Cap by mouth every bedtime. 90 Cap 3   • benazepril (LOTENSIN) 5 MG Tab TAKE 1 TABLET BY MOUTH TWICE A  Tab 3   • aspirin EC (ECOTRIN) 81 MG Tablet Delayed Response Take 81 mg by mouth 2 Times a Day.     • vitamin D (CHOLECALCIFEROL) 1000 UNIT Tab Take 1,000 Units by mouth every day.     • memantine (NAMENDA) 5 MG Tab Take 1 Tab by mouth 2 times a day. (Patient not taking: Reported on 2/10/2020) 180 Tab 3     No current Epic-ordered facility-administered medications on file.        Past Medical History:   Diagnosis Date   • Arrhythmia    • Bronchitis    • Depression    • Expressive dysphasia 5/30/2018   • Fall    • GERD (gastroesophageal reflux disease) 8/20/2009   • Hypertension    • Indigestion    • Nephrolithiasis 8/20/2009   • Renal disorder     stones   • Sarcoidosis 8/20/2009   • Seizure (HCC)    • Stroke (HCC)     TIAs and CVA   • UTI (lower urinary tract infection) 7/18/2013       Past Surgical History:   Procedure Laterality Date   • EYE SURGERY  2007    bilateral cataracts   • COLONOSCOPY  2004    normal   • ABDOMINAL HYSTERECTOMY TOTAL  1974    benign disease   • CHOLECYSTECTOMY  1960   • LAPAROTOMY  1957    partial oophorectomy--benign disease       Social History     Tobacco Use   • Smoking status: Never Smoker   • Smokeless tobacco: Never Used   • Tobacco comment: continued  "abstinence   Substance Use Topics   • Alcohol use: Yes     Comment: rare   • Drug use: No       Social History     Patient does not qualify to have social determinant information on file (likely too young).   Social History Narrative    Her family is from [place] and her 's family is from [place].  She was  to Adonis for over 60 years.  They have 3 children, Jo Ann, Deng, and Jamaal.  She is currently living with Jo Ann and Deng to provide 24-hour supervision.  She stopped driving in .  She ambulates without a gait aid.  No recent falls.  She no longer can take medications on her own or prepare food, otherwise she is independent in her ADLs.       Family History   Problem Relation Age of Onset   • Cancer Mother    • Heart Disease Mother    • Arthritis Mother         Lupus   • Heart Disease Father    • Cancer Brother    • Lung Disease Sister          from Lung Cancer at 70       Health Maintenance: Will defer to follow up in 2020. Focused on dementia today.    ROS:   As above in the HPI All Others Reviewed and Negative  Objective:     Exam: /70 (BP Location: Left arm, Patient Position: Sitting, BP Cuff Size: Adult)   Pulse 64   Temp 37.1 °C (98.7 °F) (Temporal)   Ht 1.499 m (4' 11\")   Wt 58.5 kg (128 lb 15.5 oz)   SpO2 97%  Body mass index is 26.05 kg/m².    General: Normal appearing, slightly unkempt dress. No distress. Appears stated age.  EENT: Eyes conjunctiva clear lids without ptosis, extraocular movements intact, ears normal shape and contour, canals are clear bilaterally, tympanic membranes are benign, oropharynx is without erythema, edema or exudates. Dentures with some damage (broken front tooth).  Neck: Supple without JVD. Thyroid is not enlarged.  Pulmonary: Clear to ausculation.  Normal effort. No rales, ronchi, or wheezing. No cough.  Cardiovascular: Regular rate and rhythm without murmur. No lower extremity edema bilaterally.  Abdomen: Soft, nontender, nondistended. " Normal bowel sounds.   Neurologic: +Dementia. No resting tremor.  Lymph: No cervical or supraclavicular lymph nodes are palpable  Skin: Warm and dry.  No obvious lesions on skin exposed areas.  Musculoskeletal: Normal gait. No extremity cyanosis, clubbing, or edema. Patient ambulates independently and without a gait aid.  Psych: Normal mood and anxious/flat affect. Judgment and insight is impaired.    Assessment & Plan:   85 y.o. female with the following -    Problem List Items Addressed This Visit     Hypertension     Probably requires additional evaluation.  We will update metabolic panel.  Appropriate to continue on benazepril 5 mg daily meantime.  She plans to see her primary care physician one last time in March so I will follow-up with her in April.         Relevant Orders    CBC WITH DIFFERENTIAL    Lipid Profile    Depression     Chronic problem, likely undertreated at this time.  She is taking doxepin (tricyclic antidepressant) mainly for anxiety and sleep.  She had worsened mood after she lost her  of over 60 years and according to her children have never quite bounced back.  She continues to have labile moods and cries easily.  We will update her lab work and if there is no irregularities then I will plan to initiate an additional antidepressant, we will plan for sertraline 25 mg daily.         GERD with stricture     Chronic and stable problem.  Family denies any aspiration at this time.  Patient does not complain of heartburn.  We will continue to observe and update her lab work including metabolic panel.  Recent blood counts do not demonstrate anemia.  If her symptoms return then we will place her on daily medications.         Esophageal motility disorder     Devious problem, currently stable.  Family and patient deny aspiration at this time.  Declined additional work-up or treatment in 2012.  We will continue to observe for signs and symptoms of aspiration as well as bothersome dysphagia and  can [facility] future if needed.         JINNY (obstructive sleep apnea)     Previous problem of unknown severity.  Unclear to me if patient's ever completed a sleep study.  This was listed as a probable diagnosis back in 2017.  Will discuss at our follow-up visit in April and if she and her family are interested at that time we could set her up for formal sleep study but I think with her coexisting dementia CPAP device would be quite intrusive for her sleep.         Meningioma (HCC)     Previous problem, stable.  Patient is followed up with neurology several times since this incidental finding.  She had a follow-up head imaging with a CT in 2018 and this redemonstrated the 1 cm left frontal meningioma, without significant progression or change.  Continue to observe at this time.         Grief     Chronic and ongoing problem.  She continues to experience complicated grief related to the passing of her longtime  as well as recent feelings over the loss of 1 of her children.  Provided emotional support and will plan to initiate another antidepressant as described above.         Vitamin B deficiency     Previous problem, requires follow-up.  Will recheck B1 and B12 vitamins on next lab work to ensure appropriate repletion and if not we will start her on supplementation.         Relevant Orders    VITAMIN B1    Dementia with behavioral disturbance (HCC) FAST stage 4     Chronic ongoing problem.  FAST stage 4. Shared with her family that this is likely a vascular dementia phenomena due to the stepwise decline in her cognition.  There is absence of characteristics for Lewy body dementia.  Patient also does not seem to have typical Alzheimer's dementia except for the fact that her short-term memory is impaired, however there are several other areas also impaired.  Will plan to perform [place] testing at her follow-up to establish a baseline for her now.  Her family will check to see if she is  "taking the Namenda right now and if not we could also consider donepezil to see if that would provide any additional clarity.  She has had multiple head images over the years and I do not think adding an MRI brain at this time would change her treatment plan.  Discussed some options for adult  during the day but the patient and her family declined at this time, and fortunately her daughter works from home and is able to provide supervision.  No neuropsychiatric behaviors except during her \"episodes \".  I shared with the family that I suspect these were episodes of delirium due to underlying metabolic disturbance, medication side effect, or or infection.  We will update her lab work to ensure stability now.  I advised her family that if she demonstrates these erratic behaviors in the future they should let me know as there might be an underlying medical condition that is manifesting as the delirium.  They voiced understanding and will keep me updated through my chart.         Relevant Orders    Comp Metabolic Panel    Seizure disorder (HCC)     Previous problem, unclear severity/etiology.  It looks like she has completed a few EEGs over the years and the latest 1 did not seem to demonstrate any epileptic activity, however I think she was on Keppra at that time.  Her children do not think she is taking this medicine anymore but will double check when they get home and let me know.  She is not following up with neurology with any regularity now.  No seizure activity or syncopal episodes since 2018.           History of CVA (cerebrovascular accident)     Previous problem, stable.  No recurrence of stroke.  Continue on baby aspirin and simvastatin for additional protection from stroke moving forward.         Relevant Orders    Comp Metabolic Panel    TSH WITH REFLEX TO FT4    VITAMIN B12    Lipid Profile    Vitamin D deficiency     Previous problem of unknown severity.  We will recheck vitamin D level to ensure " appropriate repletion and if not we will plan to increase her supplementation.  Also discussed if she would like to complete a bone density at her next visit in April 2020.         Relevant Orders    VITAMIN D,25 HYDROXY         Return in about 2 months (around 4/10/2020).    Please note that this dictation was created using voice recognition software. I have made every reasonable attempt to correct obvious errors, but I expect that there are errors of grammar and possibly content that I did not discover before finalizing the note.

## 2020-02-11 NOTE — ASSESSMENT & PLAN NOTE
Chronic problem, likely undertreated at this time.  She is taking doxepin (tricyclic antidepressant) mainly for anxiety and sleep.  She had worsened mood after she lost her  of over 60 years and according to her children have never quite bounced back.  She continues to have labile moods and cries easily.  We will update her lab work and if there is no irregularities then I will plan to initiate an additional antidepressant, we will plan for sertraline 25 mg daily.

## 2020-02-11 NOTE — ASSESSMENT & PLAN NOTE
Previous problem of unknown severity.  We will recheck vitamin D level to ensure appropriate repletion and if not we will plan to increase her supplementation.  Also discussed if she would like to complete a bone density at her next visit in April 2020.

## 2020-02-11 NOTE — ASSESSMENT & PLAN NOTE
Chronic and ongoing problem.  She continues to experience complicated grief related to the passing of her longtime  as well as recent feelings over the loss of 1 of her children.  Provided emotional support and will plan to initiate another antidepressant as described above.

## 2020-02-11 NOTE — ASSESSMENT & PLAN NOTE
Previous problem of unknown severity.  Unclear to me if patient's ever completed a sleep study.  This was listed as a probable diagnosis back in 2017.  Will discuss at our follow-up visit in April and if she and her family are interested at that time we could set her up for formal sleep study but I think with her coexisting dementia CPAP device would be quite intrusive for her sleep.

## 2020-02-11 NOTE — ASSESSMENT & PLAN NOTE
Devious problem, currently stable.  Family and patient deny aspiration at this time.  Declined additional work-up or treatment in 2012.  We will continue to observe for signs and symptoms of aspiration as well as bothersome dysphagia and can [facility] in [facility] future if needed.

## 2020-02-11 NOTE — ASSESSMENT & PLAN NOTE
"Chronic ongoing problem.  FAST stage 4. Shared with her family that this is likely a vascular dementia phenomena due to the stepwise decline in her cognition.  There is absence of characteristics for Lewy body dementia.  Patient also does not seem to have typical Alzheimer's dementia except for the fact that her short-term memory is impaired, however there are several other areas also impaired.  Will plan to perform [place] testing at her follow-up to establish a baseline for her now.  Her family will check to see if she is taking the Namenda right now and if not we could also consider donepezil to see if that would provide any additional clarity.  She has had multiple head images over the years and I do not think adding an MRI brain at this time would change her treatment plan.  Discussed some options for adult  during the day but the patient and her family declined at this time, and fortunately her daughter works from home and is able to provide supervision.  No neuropsychiatric behaviors except during her \"episodes \".  I shared with the family that I suspect these were episodes of delirium due to underlying metabolic disturbance, medication side effect, or or infection.  We will update her lab work to ensure stability now.  I advised her family that if she demonstrates these erratic behaviors in the future they should let me know as there might be an underlying medical condition that is manifesting as the delirium.  They voiced understanding and will keep me updated through my chart.  "

## 2020-02-11 NOTE — ASSESSMENT & PLAN NOTE
Chronic and stable problem.  Family denies any aspiration at this time.  Patient does not complain of heartburn.  We will continue to observe and update her lab work including metabolic panel.  Recent blood counts do not demonstrate anemia.  If her symptoms return then we will place her on daily medications.

## 2020-02-11 NOTE — ASSESSMENT & PLAN NOTE
Probably requires additional evaluation.  We will update metabolic panel.  Appropriate to continue on benazepril 5 mg daily meantime.  She plans to see her primary care physician one last time in March so I will follow-up with her in April.

## 2020-02-11 NOTE — ASSESSMENT & PLAN NOTE
Previous problem, stable.  Patient is followed up with neurology several times since this incidental finding.  She had a follow-up head imaging with a CT in 2018 and this redemonstrated the 1 cm left frontal meningioma, without significant progression or change.  Continue to observe at this time.

## 2020-02-11 NOTE — ASSESSMENT & PLAN NOTE
Previous problem, unclear severity/etiology.  It looks like she has completed a few EEGs over the years and the latest 1 did not seem to demonstrate any epileptic activity, however I think she was on Keppra at that time.  Her children do not think she is taking this medicine anymore but will double check when they get home and let me know.  She is not following up with neurology with any regularity now.  No seizure activity or syncopal episodes since 2018.

## 2020-02-11 NOTE — ASSESSMENT & PLAN NOTE
Previous problem, requires follow-up.  Will recheck B1 and B12 vitamins on next lab work to ensure appropriate repletion and if not we will start her on supplementation.

## 2020-02-11 NOTE — ASSESSMENT & PLAN NOTE
Previous problem, stable.  No recurrence of stroke.  Continue on baby aspirin and simvastatin for additional protection from stroke moving forward.

## 2020-02-24 ENCOUNTER — HOSPITAL ENCOUNTER (OUTPATIENT)
Dept: LAB | Facility: MEDICAL CENTER | Age: 85
End: 2020-02-24
Attending: INTERNAL MEDICINE
Payer: MEDICARE

## 2020-02-24 DIAGNOSIS — E53.9 VITAMIN B DEFICIENCY: ICD-10-CM

## 2020-02-24 DIAGNOSIS — Z86.73 HISTORY OF CVA (CEREBROVASCULAR ACCIDENT): ICD-10-CM

## 2020-02-24 DIAGNOSIS — I10 ESSENTIAL HYPERTENSION: ICD-10-CM

## 2020-02-24 DIAGNOSIS — F02.818 DEMENTIA ASSOCIATED WITH OTHER UNDERLYING DISEASE WITH BEHAVIORAL DISTURBANCE (HCC): ICD-10-CM

## 2020-02-24 DIAGNOSIS — E55.9 VITAMIN D DEFICIENCY: ICD-10-CM

## 2020-02-24 LAB
ALBUMIN SERPL BCP-MCNC: 3.9 G/DL (ref 3.2–4.9)
ALBUMIN/GLOB SERPL: 1.4 G/DL
ALP SERPL-CCNC: 54 U/L (ref 30–99)
ALT SERPL-CCNC: 16 U/L (ref 2–50)
ANION GAP SERPL CALC-SCNC: 8 MMOL/L (ref 0–11.9)
AST SERPL-CCNC: 26 U/L (ref 12–45)
BASOPHILS # BLD AUTO: 0.9 % (ref 0–1.8)
BASOPHILS # BLD: 0.03 K/UL (ref 0–0.12)
BILIRUB SERPL-MCNC: 1.1 MG/DL (ref 0.1–1.5)
BUN SERPL-MCNC: 15 MG/DL (ref 8–22)
CALCIUM SERPL-MCNC: 9.5 MG/DL (ref 8.5–10.5)
CHLORIDE SERPL-SCNC: 109 MMOL/L (ref 96–112)
CHOLEST SERPL-MCNC: 118 MG/DL (ref 100–199)
CO2 SERPL-SCNC: 27 MMOL/L (ref 20–33)
CREAT SERPL-MCNC: 1.07 MG/DL (ref 0.5–1.4)
EOSINOPHIL # BLD AUTO: 0.09 K/UL (ref 0–0.51)
EOSINOPHIL NFR BLD: 2.6 % (ref 0–6.9)
ERYTHROCYTE [DISTWIDTH] IN BLOOD BY AUTOMATED COUNT: 49.3 FL (ref 35.9–50)
FASTING STATUS PATIENT QL REPORTED: NORMAL
GLOBULIN SER CALC-MCNC: 2.7 G/DL (ref 1.9–3.5)
GLUCOSE SERPL-MCNC: 102 MG/DL (ref 65–99)
HCT VFR BLD AUTO: 40.4 % (ref 37–47)
HDLC SERPL-MCNC: 52 MG/DL
HGB BLD-MCNC: 13.2 G/DL (ref 12–16)
IMM GRANULOCYTES # BLD AUTO: 0.01 K/UL (ref 0–0.11)
IMM GRANULOCYTES NFR BLD AUTO: 0.3 % (ref 0–0.9)
LDLC SERPL CALC-MCNC: 54 MG/DL
LYMPHOCYTES # BLD AUTO: 1.08 K/UL (ref 1–4.8)
LYMPHOCYTES NFR BLD: 30.8 % (ref 22–41)
MCH RBC QN AUTO: 32.9 PG (ref 27–33)
MCHC RBC AUTO-ENTMCNC: 32.7 G/DL (ref 33.6–35)
MCV RBC AUTO: 100.7 FL (ref 81.4–97.8)
MONOCYTES # BLD AUTO: 0.29 K/UL (ref 0–0.85)
MONOCYTES NFR BLD AUTO: 8.3 % (ref 0–13.4)
NEUTROPHILS # BLD AUTO: 2.01 K/UL (ref 2–7.15)
NEUTROPHILS NFR BLD: 57.1 % (ref 44–72)
NRBC # BLD AUTO: 0 K/UL
NRBC BLD-RTO: 0 /100 WBC
PLATELET # BLD AUTO: 102 K/UL (ref 164–446)
PMV BLD AUTO: 11.7 FL (ref 9–12.9)
POTASSIUM SERPL-SCNC: 3.7 MMOL/L (ref 3.6–5.5)
PROT SERPL-MCNC: 6.6 G/DL (ref 6–8.2)
RBC # BLD AUTO: 4.01 M/UL (ref 4.2–5.4)
SODIUM SERPL-SCNC: 144 MMOL/L (ref 135–145)
TRIGL SERPL-MCNC: 60 MG/DL (ref 0–149)
WBC # BLD AUTO: 3.5 K/UL (ref 4.8–10.8)

## 2020-02-24 PROCEDURE — 85025 COMPLETE CBC W/AUTO DIFF WBC: CPT

## 2020-02-24 PROCEDURE — 82607 VITAMIN B-12: CPT

## 2020-02-24 PROCEDURE — 82306 VITAMIN D 25 HYDROXY: CPT

## 2020-02-24 PROCEDURE — 84443 ASSAY THYROID STIM HORMONE: CPT

## 2020-02-24 PROCEDURE — 80053 COMPREHEN METABOLIC PANEL: CPT

## 2020-02-24 PROCEDURE — 36415 COLL VENOUS BLD VENIPUNCTURE: CPT

## 2020-02-24 PROCEDURE — 80061 LIPID PANEL: CPT

## 2020-02-24 PROCEDURE — 84425 ASSAY OF VITAMIN B-1: CPT

## 2020-02-25 LAB
25(OH)D3 SERPL-MCNC: 73 NG/ML (ref 30–100)
TSH SERPL DL<=0.005 MIU/L-ACNC: 2.29 UIU/ML (ref 0.38–5.33)
VIT B12 SERPL-MCNC: 287 PG/ML (ref 211–911)

## 2020-02-27 LAB — VIT B1 BLD-MCNC: 81 NMOL/L (ref 70–180)

## 2020-03-10 NOTE — RESULT ENCOUNTER NOTE
Bill Thomson,    I know you will see Dr. Wakefield in a few days but wanted to update you on lab findings.  Your vitamin B1 and vitamin D levels are at a sufficient level.  Your vitamin B12 level continues to be on the low end of normal but still technically in the normal range.  Thyroid function is normal and stable.  Cholesterol panel looks excellent, all values are in the normal parameter.  Electrolytes, liver function, and protein levels are all normal.  You do have a slight bump in your kidney function. The filtration percent is 49% with > 90% being normal, you have ranged from 41-60% in the past several years so stable overall. This can be due to dehydration or slow progression of chronic kidney disease.  We should consider rechecking in 1 to 2 weeks nonfasting to make sure you are well-hydrated to see if it resolves. Blood counts are similar to previous with low white blood cells and low platelets and slight enlargement of red blood cells. I will order a repeat non-fasting kidney function to  complete before you see me next. Let me know if you have follow up questions for me.    Thanks,  Dr. Meadows

## 2020-03-11 ENCOUNTER — OFFICE VISIT (OUTPATIENT)
Dept: MEDICAL GROUP | Facility: PHYSICIAN GROUP | Age: 85
End: 2020-03-11
Payer: MEDICARE

## 2020-03-11 VITALS
RESPIRATION RATE: 16 BRPM | HEIGHT: 59 IN | TEMPERATURE: 98.2 F | BODY MASS INDEX: 26.21 KG/M2 | HEART RATE: 68 BPM | DIASTOLIC BLOOD PRESSURE: 68 MMHG | SYSTOLIC BLOOD PRESSURE: 132 MMHG | OXYGEN SATURATION: 95 % | WEIGHT: 130 LBS

## 2020-03-11 DIAGNOSIS — F43.21 GRIEF: ICD-10-CM

## 2020-03-11 DIAGNOSIS — I49.3 ASYMPTOMATIC PVCS: ICD-10-CM

## 2020-03-11 DIAGNOSIS — R41.3 MEMORY DIFFICULTIES: ICD-10-CM

## 2020-03-11 DIAGNOSIS — F02.818 DEMENTIA ASSOCIATED WITH OTHER UNDERLYING DISEASE WITH BEHAVIORAL DISTURBANCE (HCC): ICD-10-CM

## 2020-03-11 DIAGNOSIS — I10 ESSENTIAL HYPERTENSION: ICD-10-CM

## 2020-03-11 PROCEDURE — 99214 OFFICE O/P EST MOD 30 MIN: CPT | Performed by: FAMILY MEDICINE

## 2020-03-11 RX ORDER — MEMANTINE HYDROCHLORIDE 5 MG/1
5 TABLET ORAL 2 TIMES DAILY
Qty: 180 TAB | Refills: 3 | Status: SHIPPED | OUTPATIENT
Start: 2020-03-11 | End: 2021-03-28 | Stop reason: SDUPTHER

## 2020-03-11 ASSESSMENT — FIBROSIS 4 INDEX: FIB4 SCORE: 5.42

## 2020-03-11 NOTE — PROGRESS NOTES
Patient comes in with several issues.  She is with her son-in-law.  She has recently established with Dr. Meadows and likes her very much.  It turns out that the patient was taking Keppra without the knowledge of her family even though it had been discontinued.  Keppra made her very irritable and hostile and forgetful.  She also was not taking memantine which had been prescribed for her memory issues.  I discussed this with the patient son-in-law and the patient's daughter on the phone and we straighten this out.  We are going to keep the patient off Keppra and refill her memantine 5 mg p.o. twice daily.  Patient has no chest pains no shortness of breath.  She still misses her late .  She has had no falls.    I reviewed the following    Past Medical History:   Diagnosis Date   • Arrhythmia    • Bronchitis    • Depression    • Expressive dysphasia 5/30/2018   • Fall    • GERD (gastroesophageal reflux disease) 8/20/2009   • Hypertension    • Indigestion    • Nephrolithiasis 8/20/2009   • Renal disorder     stones   • Sarcoidosis 8/20/2009   • Seizure (HCC)    • Stroke (HCC)     TIAs and CVA   • UTI (lower urinary tract infection) 7/18/2013        Past Surgical History:   Procedure Laterality Date   • EYE SURGERY  2007    bilateral cataracts   • COLONOSCOPY  2004    normal   • ABDOMINAL HYSTERECTOMY TOTAL  1974    benign disease   • CHOLECYSTECTOMY  1960   • LAPAROTOMY  1957    partial oophorectomy--benign disease       Allergies   Allergen Reactions   • Bupropion    • Cymbalta [Duloxetine Hcl] Nausea   • Darvocet [Propoxyphene N-Apap]      Dizziness     • Fluoxetine      suicidal thoughts      • Iodine    • Keppra [Levetiracetam]      irritability   • Lexapro    • Lipitor [Atorvastatin Calcium]      muscle aches         Current Outpatient Medications   Medication Sig Dispense Refill   • memantine (NAMENDA) 5 MG Tab Take 1 Tab by mouth 2 times a day. 180 Tab 3   • simvastatin (ZOCOR) 40 MG Tab TAKE 1 TABLET BY  MOUTH EVERY EVENING 90 Tab 3   • doxepin (SINEQUAN) 10 MG Cap Take 1 Cap by mouth every bedtime. 90 Cap 3   • benazepril (LOTENSIN) 5 MG Tab TAKE 1 TABLET BY MOUTH TWICE A  Tab 3   • aspirin EC (ECOTRIN) 81 MG Tablet Delayed Response Take 81 mg by mouth 2 Times a Day.     • vitamin D (CHOLECALCIFEROL) 1000 UNIT Tab Take 1,000 Units by mouth every day.       No current facility-administered medications for this visit.         Family History   Problem Relation Age of Onset   • Cancer Mother    • Heart Disease Mother    • Arthritis Mother         Lupus   • Heart Disease Father    • Cancer Brother    • Lung Disease Sister          from Lung Cancer at 70       Social History     Socioeconomic History   • Marital status:      Spouse name: Not on file   • Number of children: Not on file   • Years of education: Not on file   • Highest education level: Not on file   Occupational History   • Not on file   Social Needs   • Financial resource strain: Not on file   • Food insecurity     Worry: Not on file     Inability: Not on file   • Transportation needs     Medical: Not on file     Non-medical: Not on file   Tobacco Use   • Smoking status: Never Smoker   • Smokeless tobacco: Never Used   • Tobacco comment: continued abstinence   Substance and Sexual Activity   • Alcohol use: Yes     Comment: rare   • Drug use: No   • Sexual activity: Yes     Partners: Male     Birth control/protection: Post-Menopausal     Comment:    Lifestyle   • Physical activity     Days per week: Not on file     Minutes per session: Not on file   • Stress: Not on file   Relationships   • Social connections     Talks on phone: Not on file     Gets together: Not on file     Attends Voodoo service: Not on file     Active member of club or organization: Not on file     Attends meetings of clubs or organizations: Not on file     Relationship status: Not on file   • Intimate partner violence     Fear of current or ex partner: Not on  file     Emotionally abused: Not on file     Physically abused: Not on file     Forced sexual activity: Not on file   Other Topics Concern   • Not on file   Social History Narrative    Her family is from [place] and her 's family is from [place].  She was  to Adonis for over 60 years.  They have 3 children, Jo Ann, Deng, and Jamaal.  She is currently living with Jo Ann and Deng to provide 24-hour supervision.  She stopped driving in 2017.  She ambulates without a gait aid.  No recent falls.  She no longer can take medications on her own or prepare food, otherwise she is independent in her ADLs.      Physical Exam   Constitutional: She is oriented. She appears well-developed and well-nourished. No distress.   HENT:  Head: Normocephalic and atraumatic.   Right Ear: External ear normal. Ear canal and TM normal   Left Ear: External ear normal. Ear canal and TM normal  Nose: Nose normal.   Mouth/Throat: Oropharynx is clear and moist.   Eyes: Conjunctivae and extraocular motions are normal. Pupils are equal, round, and reactive to light. Fundi benign bilaterally   Neck: No thyromegaly present.   Cardiovascular: Normal rate, regular rhythm, normal heart sounds and intact distal pulses.  Exam reveals no gallop.    No murmur heard.  Pulmonary/Chest: Effort normal and breath sounds normal. No respiratory distress. She has no wheezes. She has no rales.   Abdominal: Soft. Bowel sounds are normal. No hepatosplenomegaly She exhibits no distension. No tenderness. She has no rebound and no guarding.   Musculoskeletal: She walks about the room quite slowly.  She exhibits no edema and no tenderness.   Lymphadenopathy:     She has no cervical adenopathy.   No supraclavicular adenopathy  Neurological: She is alert and oriented. She has normal reflexes.        Babinskis downgoing bilaterally   Skin: Skin is warm and dry. No rash noted. No erythema.   Psychiatric: She has a normal mood and appropriate affect. Her behavior is  normal.  She has decreased recent memory.    1. Memory difficulties  memantine (NAMENDA) 5 MG Tab--resume this at 1 p.o. twice daily   2. 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 Essential hypertension   controlled   4. Grief   the patient ventilated regarding her loss of her .   5. Asymptomatic PVCs   I did not detect any extra beats today.     Ongoing care from Dr. Meadows  I have enjoyed having this patient in my practice.    Please note that this dictation was created using voice recognition software. I have worked with consultants from the vendor as well as technical experts from [facility] [employer] to optimize the interface. I have made every reasonable attempt to correct obvious errors, but I expect that there are errors of grammar and possibly content that I did not discover before finalizing the note.

## 2020-03-11 NOTE — PATIENT INSTRUCTIONS
Patient and her son-in-law given written instructions regarding  medications, referrals, dietary and lifestyle management, and return visit.    Yoav Wakefield MD

## 2020-03-19 DIAGNOSIS — F43.21 GRIEF: ICD-10-CM

## 2020-03-19 DIAGNOSIS — F32.1 MODERATE SINGLE CURRENT EPISODE OF MAJOR DEPRESSIVE DISORDER (HCC): ICD-10-CM

## 2020-03-19 RX ORDER — DOXEPIN HYDROCHLORIDE 10 MG/1
10 CAPSULE ORAL
Qty: 90 CAP | Refills: 3 | Status: SHIPPED | OUTPATIENT
Start: 2020-03-19 | End: 2021-07-07

## 2020-03-19 NOTE — TELEPHONE ENCOUNTER
Received request via: Pharmacy    Was the patient seen in the last year in this department? Yes lov 2/7/2020    Does the patient have an active prescription (recently filled or refills available) for medication(s) requested? No

## 2020-10-26 DIAGNOSIS — I10 ESSENTIAL HYPERTENSION: ICD-10-CM

## 2020-10-26 DIAGNOSIS — E78.2 MIXED HYPERLIPIDEMIA: ICD-10-CM

## 2020-10-26 RX ORDER — BENAZEPRIL HYDROCHLORIDE 5 MG/1
TABLET ORAL
Qty: 180 TAB | Refills: 3 | Status: SHIPPED | OUTPATIENT
Start: 2020-10-26

## 2020-10-26 RX ORDER — SIMVASTATIN 40 MG
TABLET ORAL
Qty: 90 TAB | Refills: 3 | Status: SHIPPED | OUTPATIENT
Start: 2020-10-26 | End: 2021-01-14

## 2021-01-11 DIAGNOSIS — Z23 NEED FOR VACCINATION: ICD-10-CM

## 2021-01-11 SDOH — ECONOMIC STABILITY: INCOME INSECURITY: IN THE LAST 12 MONTHS, WAS THERE A TIME WHEN YOU WERE NOT ABLE TO PAY THE MORTGAGE OR RENT ON TIME?: NO

## 2021-01-11 SDOH — HEALTH STABILITY: MENTAL HEALTH
STRESS IS WHEN SOMEONE FEELS TENSE, NERVOUS, ANXIOUS, OR CAN'T SLEEP AT NIGHT BECAUSE THEIR MIND IS TROUBLED. HOW STRESSED ARE YOU?: ONLY A LITTLE

## 2021-01-11 SDOH — ECONOMIC STABILITY: HOUSING INSECURITY
IN THE LAST 12 MONTHS, WAS THERE A TIME WHEN YOU DID NOT HAVE A STEADY PLACE TO SLEEP OR SLEPT IN A SHELTER (INCLUDING NOW)?: NO

## 2021-01-11 SDOH — HEALTH STABILITY: PHYSICAL HEALTH: ON AVERAGE, HOW MANY MINUTES DO YOU ENGAGE IN EXERCISE AT THIS LEVEL?: 20 MINUTES

## 2021-01-11 SDOH — ECONOMIC STABILITY: TRANSPORTATION INSECURITY
IN THE PAST 12 MONTHS, HAS LACK OF RELIABLE TRANSPORTATION KEPT YOU FROM MEDICAL APPOINTMENTS, MEETINGS, WORK OR FROM GETTING THINGS NEEDED FOR DAILY LIVING?: NO

## 2021-01-11 SDOH — ECONOMIC STABILITY: HOUSING INSECURITY: IN THE LAST 12 MONTHS, HOW MANY PLACES HAVE YOU LIVED?: 1

## 2021-01-11 SDOH — ECONOMIC STABILITY: TRANSPORTATION INSECURITY
IN THE PAST 12 MONTHS, HAS THE LACK OF TRANSPORTATION KEPT YOU FROM MEDICAL APPOINTMENTS OR FROM GETTING MEDICATIONS?: NO

## 2021-01-11 SDOH — HEALTH STABILITY: PHYSICAL HEALTH: ON AVERAGE, HOW MANY DAYS PER WEEK DO YOU ENGAGE IN MODERATE TO STRENUOUS EXERCISE (LIKE A BRISK WALK)?: 5 DAYS

## 2021-01-11 ASSESSMENT — SOCIAL DETERMINANTS OF HEALTH (SDOH)
HOW MANY DRINKS CONTAINING ALCOHOL DO YOU HAVE ON A TYPICAL DAY WHEN YOU ARE DRINKING: DECLINE
WITHIN THE PAST 12 MONTHS, THE FOOD YOU BOUGHT JUST DIDN'T LAST AND YOU DIDN'T HAVE MONEY TO GET MORE: NEVER TRUE
IN A TYPICAL WEEK, HOW MANY TIMES DO YOU TALK ON THE PHONE WITH FAMILY, FRIENDS, OR NEIGHBORS?: MORE THAN THREE TIMES A WEEK
WITHIN THE PAST 12 MONTHS, YOU WORRIED THAT YOUR FOOD WOULD RUN OUT BEFORE YOU GOT THE MONEY TO BUY MORE: NEVER TRUE
HOW OFTEN DO YOU ATTEND CHURCH OR RELIGIOUS SERVICES?: NEVER
DO YOU BELONG TO ANY CLUBS OR ORGANIZATIONS SUCH AS CHURCH GROUPS UNIONS, FRATERNAL OR ATHLETIC GROUPS, OR SCHOOL GROUPS?: NO
HOW OFTEN DO YOU HAVE SIX OR MORE DRINKS ON ONE OCCASION: NEVER
HOW OFTEN DO YOU ATTENT MEETINGS OF THE CLUB OR ORGANIZATION YOU BELONG TO?: NEVER
HOW OFTEN DO YOU HAVE A DRINK CONTAINING ALCOHOL: NEVER
HOW OFTEN DO YOU GET TOGETHER WITH FRIENDS OR RELATIVES?: ONCE A WEEK
HOW HARD IS IT FOR YOU TO PAY FOR THE VERY BASICS LIKE FOOD, HOUSING, MEDICAL CARE, AND HEATING?: NOT HARD AT ALL

## 2021-01-13 ASSESSMENT — ENCOUNTER SYMPTOMS
PALPITATIONS: 0
FEVER: 0
BLURRED VISION: 0
CHILLS: 0
DIARRHEA: 0
DEPRESSION: 0
SHORTNESS OF BREATH: 0
WEAKNESS: 0
NAUSEA: 0
VOMITING: 0
CONSTIPATION: 0

## 2021-01-13 NOTE — PROGRESS NOTES
History of Present Illness  85 year old female presents to clinic to establish care.  She has a history of hypertension for which she takes benazepril.  She does not check her pressures regularly at home.  She denies any chest pain, palpitations, or headaches.    She does have some dementia and is using memantine for this.  Neither the patient nor her daughter have noticed any improvement since starting this medication.  Her daughter does note that there has been no worsening of her symptoms since starting this which she is happy about.  The patient is pleasantly confused upon her visit today, she is unable to answer simple questions about her past such as where she is from.  She is orientated to her self but not place or time.    She has some associated anxiety, and is using doxepin for this.  She has been on it for years and feels like her anxiety is well controlled.  She denies any panic attacks.    She does have a meningioma which was an incidental finding on head CT July 2015.  This was again seen on CT 10/5/2018, and remained unchanged.  She also has a seizure disorder and in the past was followed by neurology and on levetiracetam.  There have been no known seizure activity since 2018.  Her family does not believe these are seizures, but that they were more syncopal episodes.  Patient denies any headaches, or localized weakness.    She has a history of vitamin D deficiency, and is taking supplementation for this.  Last labs were checked 2/24/2020 and levels were normal at 73.    She has chronic kidney disease stage III, the patient did not remember this.    She denies any other questions or concerns at this time.    ROS  Review of Systems   Constitutional: Negative for chills and fever.   HENT: Negative for hearing loss.    Eyes: Negative for blurred vision.   Respiratory: Negative for shortness of breath.    Cardiovascular: Negative for chest pain and palpitations.   Gastrointestinal: Negative for  constipation, diarrhea, nausea and vomiting.   Genitourinary: Negative for dysuria and hematuria.   Skin: Negative for rash.   Neurological: Negative for weakness.   Psychiatric/Behavioral: Positive for memory loss. Negative for depression.     Medications  Current Outpatient Medications   Medication Sig Dispense Refill   • benazepril (LOTENSIN) 5 MG Tab TAKE 1 TABLET BY MOUTH TWICE A  Tab 3   • doxepin (SINEQUAN) 10 MG Cap TAKE 1 CAP BY MOUTH EVERY BEDTIME. 90 Cap 3   • memantine (NAMENDA) 5 MG Tab Take 1 Tab by mouth 2 times a day. 180 Tab 3   • aspirin EC (ECOTRIN) 81 MG Tablet Delayed Response Take 81 mg by mouth 2 Times a Day.     • vitamin D (CHOLECALCIFEROL) 1000 UNIT Tab Take 1,000 Units by mouth every day.       No current facility-administered medications for this visit.      Allergies  Allergies   Allergen Reactions   • Bupropion    • Cymbalta [Duloxetine Hcl] Nausea   • Darvocet [Propoxyphene N-Apap]      Dizziness     • Fluoxetine      suicidal thoughts      • Iodine    • Keppra [Levetiracetam]      irritability   • Lexapro    • Lipitor [Atorvastatin Calcium]      muscle aches       Problem List  Patient Active Problem List   Diagnosis   • Hypertension   • Esophageal motility disorder   • Anxiety   • Meningioma (HCC)   • Dementia with behavioral disturbance (HCC) FAST stage 4   • Seizure disorder (HCC)   • History of CVA (cerebrovascular accident)   • Vitamin D deficiency   • Stage 3a chronic kidney disease     Past Medical History  Past Medical History:   Diagnosis Date   • Arrhythmia    • Bronchitis    • Depression    • Expressive dysphasia 5/30/2018   • Fall    • GERD (gastroesophageal reflux disease) 8/20/2009   • Hypertension    • Indigestion    • Nephrolithiasis 8/20/2009   • Renal disorder     stones   • Sarcoidosis 8/20/2009   • Seizure (HCC)    • Stroke (HCC)     TIAs and CVA   • UTI (lower urinary tract infection) 7/18/2013     Past Surgical History  Past Surgical History:   Procedure  "Laterality Date   • EYE SURGERY      bilateral cataracts   • COLONOSCOPY      normal   • ABDOMINAL HYSTERECTOMY TOTAL      benign disease   • CHOLECYSTECTOMY     • LAPAROTOMY      partial oophorectomy--benign disease     Past Family History  Family History   Problem Relation Age of Onset   • Cancer Mother    • Heart Disease Mother    • Arthritis Mother         Lupus   • Heart Disease Father    • Cancer Brother    • Lung Disease Sister          from Lung Cancer at 70   • Cancer Sister         liver   • Cancer Maternal Grandmother         pancreatic   • No Known Problems Son    • No Known Problems Son    • Thyroid Daughter    • Depression Daughter    • Anxiety disorder Daughter      Social History  She reports eating a healthy and balanced diet, as well as getting regular exercise, particularly through walking. She lives in her own home with her daughter and son. She is currently retired, but previously worked at the [employer]. She denies any alcohol, tobacco product, or illicit drug use. She is not currently sexually active.    Physical Exam  /70 (BP Location: Left arm, Patient Position: Sitting, BP Cuff Size: Adult)   Pulse (!) 50   Temp 36.4 °C (97.6 °F) (Temporal)   Ht 1.499 m (4' 11\")   Wt 56 kg (123 lb 7.3 oz)   LMP 1974   SpO2 98%   BMI 24.94 kg/m²   Physical Exam   Constitutional: She is well-developed, well-nourished, and in no distress. No distress.   HENT:   Head: Normocephalic and atraumatic.   Right Ear: Tympanic membrane, external ear and ear canal normal.   Left Ear: Tympanic membrane, external ear and ear canal normal.   Eyes: Pupils are equal, round, and reactive to light. Right eye exhibits no discharge. Left eye exhibits no discharge. No scleral icterus.   Neck: No thyromegaly present.   Cardiovascular: Normal rate, regular rhythm and normal heart sounds.   Pulmonary/Chest: Effort normal and breath sounds normal. No respiratory distress. "   Abdominal: Soft. Bowel sounds are normal. She exhibits no distension. There is no abdominal tenderness.   Musculoskeletal:         General: No edema.   Neurological: She is alert.   Patient is oriented to self but not place or time.   Skin: Skin is warm and dry. She is not diaphoretic.   Psychiatric: Affect and judgment normal.     Assessment & Plan  1. Essential hypertension  This problem is new to me, however is chronic and stable.  We will have her continue with the same medications.  - Comp Metabolic Panel; Future    2. 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  We will have her continue with the same medications.  Presently she is able to stay in her own home, but there would likely, time in the near future where this will change.  The daughter is aware.    3. Anxiety  This problem is new to me, however is chronic and stable.  Continue with doxepin.    4. Meningioma (HCC)  5. Seizure disorder (HCC)  These problems are new to me, however chronic and stable.  We will continue to monitor.    6. Vitamin D deficiency  This problem is new to me, however is chronic and stable.  We will have her continue with vitamin D supplementation.    7. Stage IIIa chronic kidney disease  I did encourage the patient to try and stay well-hydrated, by drinking plenty of water.  We also discussed which drugs are nephrotoxic and to avoid them.    Return in about 6 months (around 7/14/2021) for Chronic disease management.    Victorina Lara M.D.

## 2021-01-14 ENCOUNTER — OFFICE VISIT (OUTPATIENT)
Dept: MEDICAL GROUP | Facility: MEDICAL CENTER | Age: 86
End: 2021-01-14
Payer: MEDICARE

## 2021-01-14 VITALS
OXYGEN SATURATION: 98 % | HEART RATE: 50 BPM | HEIGHT: 59 IN | BODY MASS INDEX: 24.89 KG/M2 | WEIGHT: 123.46 LBS | DIASTOLIC BLOOD PRESSURE: 70 MMHG | TEMPERATURE: 97.6 F | SYSTOLIC BLOOD PRESSURE: 120 MMHG

## 2021-01-14 DIAGNOSIS — N18.31 STAGE 3A CHRONIC KIDNEY DISEASE: ICD-10-CM

## 2021-01-14 DIAGNOSIS — I10 ESSENTIAL HYPERTENSION: ICD-10-CM

## 2021-01-14 DIAGNOSIS — E55.9 VITAMIN D DEFICIENCY: ICD-10-CM

## 2021-01-14 DIAGNOSIS — F41.9 ANXIETY: ICD-10-CM

## 2021-01-14 DIAGNOSIS — D32.9 MENINGIOMA (HCC): ICD-10-CM

## 2021-01-14 DIAGNOSIS — G40.909 SEIZURE DISORDER (HCC): ICD-10-CM

## 2021-01-14 DIAGNOSIS — F02.818 DEMENTIA ASSOCIATED WITH OTHER UNDERLYING DISEASE WITH BEHAVIORAL DISTURBANCE (HCC): ICD-10-CM

## 2021-01-14 PROBLEM — H53.9 CHANGE IN VISION: Status: RESOLVED | Noted: 2019-11-19 | Resolved: 2021-01-14

## 2021-01-14 PROBLEM — R41.3 MEMORY DIFFICULTIES: Status: RESOLVED | Noted: 2020-03-11 | Resolved: 2021-01-14

## 2021-01-14 PROCEDURE — 99214 OFFICE O/P EST MOD 30 MIN: CPT | Performed by: FAMILY MEDICINE

## 2021-01-14 ASSESSMENT — PATIENT HEALTH QUESTIONNAIRE - PHQ9
9. THOUGHTS THAT YOU WOULD BE BETTER OFF DEAD, OR OF HURTING YOURSELF: NOT AT ALL
6. FEELING BAD ABOUT YOURSELF - OR THAT YOU ARE A FAILURE OR HAVE LET YOURSELF OR YOUR FAMILY DOWN: NOT AL ALL
2. FEELING DOWN, DEPRESSED, IRRITABLE, OR HOPELESS: NOT AT ALL
5. POOR APPETITE OR OVEREATING: 0 - NOT AT ALL
5. POOR APPETITE OR OVEREATING: NOT AT ALL
7. TROUBLE CONCENTRATING ON THINGS, SUCH AS READING THE NEWSPAPER OR WATCHING TELEVISION: NOT AT ALL
1. LITTLE INTEREST OR PLEASURE IN DOING THINGS: SEVERAL DAYS
8. MOVING OR SPEAKING SO SLOWLY THAT OTHER PEOPLE COULD HAVE NOTICED. OR THE OPPOSITE, BEING SO FIGETY OR RESTLESS THAT YOU HAVE BEEN MOVING AROUND A LOT MORE THAN USUAL: NOT AT ALL
4. FEELING TIRED OR HAVING LITTLE ENERGY: NOT AT ALL
SUM OF ALL RESPONSES TO PHQ9 QUESTIONS 1 AND 2: 1
CLINICAL INTERPRETATION OF PHQ2 SCORE: 2
3. TROUBLE FALLING OR STAYING ASLEEP OR SLEEPING TOO MUCH: NOT AT ALL
SUM OF ALL RESPONSES TO PHQ QUESTIONS 1-9: 4
SUM OF ALL RESPONSES TO PHQ QUESTIONS 1-9: 1

## 2021-01-14 ASSESSMENT — FIBROSIS 4 INDEX: FIB4 SCORE: 5.42

## 2021-01-14 ASSESSMENT — ENCOUNTER SYMPTOMS: MEMORY LOSS: 1

## 2021-01-14 NOTE — NON-PROVIDER
Patient Health Questionaire                                     If depressive symptoms identified deferred to follow up visit unless specifically addressed in assesment and plan.    Interpretation of PHQ-9 Total Score   Score Severity   1-4 No Depression   5-9 Mild Depression   10-14 Moderate Depression   15-19 Moderately Severe Depression   20-27 Severe Depression

## 2021-01-15 ENCOUNTER — IMMUNIZATION (OUTPATIENT)
Dept: FAMILY PLANNING/WOMEN'S HEALTH CLINIC | Facility: IMMUNIZATION CENTER | Age: 86
End: 2021-01-15
Attending: INTERNAL MEDICINE
Payer: MEDICARE

## 2021-01-15 DIAGNOSIS — Z23 ENCOUNTER FOR VACCINATION: Primary | ICD-10-CM

## 2021-01-15 DIAGNOSIS — Z23 NEED FOR VACCINATION: ICD-10-CM

## 2021-01-15 PROCEDURE — 91300 PFIZER SARS-COV-2 VACCINE: CPT

## 2021-01-15 PROCEDURE — 0001A PFIZER SARS-COV-2 VACCINE: CPT

## 2021-02-12 ENCOUNTER — IMMUNIZATION (OUTPATIENT)
Dept: FAMILY PLANNING/WOMEN'S HEALTH CLINIC | Facility: IMMUNIZATION CENTER | Age: 86
End: 2021-02-12
Attending: INTERNAL MEDICINE
Payer: MEDICARE

## 2021-02-12 DIAGNOSIS — Z23 ENCOUNTER FOR VACCINATION: Primary | ICD-10-CM

## 2021-02-12 PROCEDURE — 0002A PFIZER SARS-COV-2 VACCINE: CPT

## 2021-02-12 PROCEDURE — 91300 PFIZER SARS-COV-2 VACCINE: CPT

## 2021-03-28 ENCOUNTER — PATIENT MESSAGE (OUTPATIENT)
Dept: MEDICAL GROUP | Facility: MEDICAL CENTER | Age: 86
End: 2021-03-28

## 2021-03-28 DIAGNOSIS — R41.3 MEMORY DIFFICULTIES: ICD-10-CM

## 2021-03-28 RX ORDER — MEMANTINE HYDROCHLORIDE 5 MG/1
5 TABLET ORAL 2 TIMES DAILY
Qty: 180 TABLET | Refills: 3 | Status: SHIPPED | OUTPATIENT
Start: 2021-03-28 | End: 2021-03-29 | Stop reason: SDUPTHER

## 2021-03-29 RX ORDER — MEMANTINE HYDROCHLORIDE 5 MG/1
5 TABLET ORAL 2 TIMES DAILY
Qty: 180 TABLET | Refills: 3 | Status: SHIPPED | OUTPATIENT
Start: 2021-03-29 | End: 2021-11-23

## 2021-03-29 NOTE — PATIENT COMMUNICATION
Received request via: Patient    Was the patient seen in the last year in this department? Yes    Does the patient have an active prescription (recently filled or refills available) for medication(s) requested? No     Requested Prescriptions     Pending Prescriptions Disp Refills   • memantine (NAMENDA) 5 MG Tab 180 tablet      Sig: Take 1 tablet by mouth 2 times a day.

## 2021-06-23 ENCOUNTER — OFFICE VISIT (OUTPATIENT)
Dept: MEDICAL GROUP | Facility: MEDICAL CENTER | Age: 86
End: 2021-06-23
Payer: MEDICARE

## 2021-06-23 VITALS
HEART RATE: 75 BPM | BODY MASS INDEX: 24.31 KG/M2 | WEIGHT: 120.59 LBS | HEIGHT: 59 IN | TEMPERATURE: 98.2 F | DIASTOLIC BLOOD PRESSURE: 68 MMHG | OXYGEN SATURATION: 96 % | SYSTOLIC BLOOD PRESSURE: 136 MMHG

## 2021-06-23 DIAGNOSIS — D32.9 MENINGIOMA (HCC): ICD-10-CM

## 2021-06-23 DIAGNOSIS — I10 ESSENTIAL HYPERTENSION: ICD-10-CM

## 2021-06-23 DIAGNOSIS — G40.909 SEIZURE DISORDER (HCC): ICD-10-CM

## 2021-06-23 DIAGNOSIS — F02.818 DEMENTIA ASSOCIATED WITH OTHER UNDERLYING DISEASE WITH BEHAVIORAL DISTURBANCE (HCC): ICD-10-CM

## 2021-06-23 DIAGNOSIS — N18.31 STAGE 3A CHRONIC KIDNEY DISEASE: ICD-10-CM

## 2021-06-23 PROCEDURE — G0439 PPPS, SUBSEQ VISIT: HCPCS | Performed by: FAMILY MEDICINE

## 2021-06-23 PROCEDURE — 8041 PR SCP AHA: Performed by: FAMILY MEDICINE

## 2021-06-23 ASSESSMENT — ENCOUNTER SYMPTOMS: GENERAL WELL-BEING: GOOD

## 2021-06-23 ASSESSMENT — ACTIVITIES OF DAILY LIVING (ADL): BATHING_REQUIRES_ASSISTANCE: 1

## 2021-06-23 ASSESSMENT — PATIENT HEALTH QUESTIONNAIRE - PHQ9
5. POOR APPETITE OR OVEREATING: 0 - NOT AT ALL
CLINICAL INTERPRETATION OF PHQ2 SCORE: 1
SUM OF ALL RESPONSES TO PHQ QUESTIONS 1-9: 9

## 2021-06-23 ASSESSMENT — FIBROSIS 4 INDEX: FIB4 SCORE: 5.48

## 2021-06-23 NOTE — PROGRESS NOTES
Chief Complaint   Patient presents with   • Annual Exam       HPI:  Alix Rush is a 86 year old here with her son and daughter for Medicare Annual Wellness Visit.      She does have dementia which worsened drastically after getting her second Covid vaccine 2/12/2021.  During the day she does okay, but at night is unable to recognize any of her children.  Patient states she has noticed somewhat of a decline in her memory, but is really unable to provide further history due to confusion.  She continues to be oriented to herself, but not place or time.  She continues with memantine.  She was diagnosed with a meningioma that was an incidental finding on head CT in July 2015, this remain unchanged on CT 2018.  She was previously diagnosed with a seizure disorder, but is now no longer being followed by neurology or on levetiracetam.    No seizure activity since 2018.  She has some associated anxiety, and is using doxepin for this.  She has been on it for years and feels like her anxiety is well controlled.  She denies any panic attacks.     She has chronic kidney disease stage III.  She is avoiding nephrotoxic medications, and trying to stay well-hydrated.    She has a history of hypertension for which she takes benazepril.  She does not check her pressures regularly at home.  She denies any chest pain, palpitations, or headaches.    Patient Active Problem List    Diagnosis Date Noted   • Stage 3a chronic kidney disease (HCC) 01/14/2021   • Vitamin D deficiency 02/11/2020   • History of CVA (cerebrovascular accident) 02/07/2020   • Dementia with behavioral disturbance (HCC) FAST stage 4 10/30/2018   • Seizure disorder (HCC) 10/30/2018   • Meningioma (HCC) 07/30/2015   • Anxiety 05/10/2012   • Esophageal motility disorder 03/19/2012   • Hypertension 08/20/2009       Current Outpatient Medications   Medication Sig Dispense Refill   • memantine (NAMENDA) 5 MG Tab Take 1 tablet by mouth 2 times a day. 180 tablet 3    • benazepril (LOTENSIN) 5 MG Tab TAKE 1 TABLET BY MOUTH TWICE A  Tab 3   • doxepin (SINEQUAN) 10 MG Cap TAKE 1 CAP BY MOUTH EVERY BEDTIME. 90 Cap 3   • aspirin EC (ECOTRIN) 81 MG Tablet Delayed Response Take 81 mg by mouth 2 Times a Day.     • vitamin D (CHOLECALCIFEROL) 1000 UNIT Tab Take 1,000 Units by mouth every day.       No current facility-administered medications for this visit.          Current supplements as per medication list.     Allergies: Bupropion, Cymbalta [duloxetine hcl], Darvocet [propoxyphene n-apap], Fluoxetine, Iodine, Keppra [levetiracetam], Lexapro, and Lipitor [atorvastatin calcium]    Current social contact/activities: She previously enjoyed reading, and sewing, due to her memory issues is unable to focus on these tasks anymore.  She does enjoy sitting in the yard, watching the birds, and occasionally goes for walks with family members.        She  reports that she has never smoked. She has never used smokeless tobacco. She reports current alcohol use. She reports that she does not use drugs.  Counseling given: Not Answered  Comment: continued abstinence      DPA/Advanced Directive:  Patient has Advanced Directive on file.     ROS:    Gait: Uses a walker  Ostomy: No  Other tubes: No  Amputations: No  Chronic oxygen use: No  Last eye exam: a few months ago  Wears hearing aids: No   : Denies any urinary leakage during the last 6 months    Screening:    Depression Screening  Little interest or pleasure in doing things?  0 - not at all  Feeling down, depressed , or hopeless? 1 - several days  Trouble falling or staying asleep, or sleeping too much?  0 - not at all  Feeling tired or having little energy?  0 - not at all  Poor appetite or overeating?  0 - not at all  Feeling bad about yourself - or that you are a failure or have let yourself or your family down? 3 - nearly every day  Trouble concentrating on things, such as reading the newspaper or watching television? 2 - more than  half the days  Moving or speaking so slowly that other people could have noticed.  Or the opposite - being so fidgety or restless that you have been moving around a lot more than usual?  2 - more than half the days  Thoughts that you would be better off dead, or of hurting yourself?  1 - several days  Patient Health Questionnaire Score: 9    If depressive symptoms identified deferred to follow up visit unless specifically addressed in assessment and plan.    Interpretation of PHQ-9 Total Score   Score Severity   1-4 No Depression   5-9 Mild Depression   10-14 Moderate Depression   15-19 Moderately Severe Depression   20-27 Severe Depression    Screening for Cognitive Impairment  Three Minute Recall (captain, garden, picture)  /3    Vicente clock face with all 12 numbers and set the hands to show 5 past 8.  No    Cognitive concerns identified deferred for follow up unless specifically addressed in assessment and plan.    Fall Risk Assessment  Has the patient had two or more falls in the last year or any fall with injury in the last year?  No    Safety Assessment  Throw rugs on floor.  Yes  Handrails on all stairs.  Yes  Good lighting in all hallways.  Yes  Difficulty hearing.  No  Patient counseled about all safety risks that were identified.    Functional Assessment ADLs    Are there any barriers preventing you from cooking for yourself or meeting nutritional needs?  Yes.    Are there any barriers preventing you from driving safely or obtaining transportation?  No.    Are there any barriers preventing you from using a telephone or calling for help?  Yes.    Are there any barriers preventing you from shopping?  Yes.    Are there any barriers preventing you from taking care of your own finances?  Yes.    Are there any barriers preventing you from managing your medications?  Yes.    Are there any barriers preventing you from showering, bathing or dressing yourself? Yes.    Are you currently engaging in any exercise or  physical activity?  Yes.     What is your perception of your health?  Good.    Health Maintenance Summary                IMM ZOSTER VACCINES Overdue 1985     IMM DTaP/Tdap/Td Vaccine Next Due 2025      Done 2015 Imm Admin: Tdap Vaccine     Patient has more history with this topic...          Patient Care Team:  Victorina Lara M.D. as PCP - General (Family Medicine)      Social History     Tobacco Use   • Smoking status: Never Smoker   • Smokeless tobacco: Never Used   • Tobacco comment: continued abstinence   Vaping Use   • Vaping Use: Never used   Substance Use Topics   • Alcohol use: Yes     Comment: rare   • Drug use: No     Family History   Problem Relation Age of Onset   • Cancer Mother    • Heart Disease Mother    • Arthritis Mother         Lupus   • Heart Disease Father    • Cancer Brother    • Lung Disease Sister          from Lung Cancer at 70   • Cancer Sister         liver   • Cancer Maternal Grandmother         pancreatic   • No Known Problems Son    • No Known Problems Son    • Thyroid Daughter    • Depression Daughter    • Anxiety disorder Daughter      She  has a past medical history of Arrhythmia, Bronchitis, Depression, Expressive dysphasia (2018), Fall, GERD (gastroesophageal reflux disease) (2009), Hypertension, Indigestion, Nephrolithiasis (2009), Renal disorder, Sarcoidosis (2009), Seizure (HCC), Stroke (HCC), and UTI (lower urinary tract infection) (2013). She also has no past medical history of Encounter for long-term (current) use of other medications.   Past Surgical History:   Procedure Laterality Date   • EYE SURGERY      bilateral cataracts   • COLONOSCOPY      normal   • ABDOMINAL HYSTERECTOMY TOTAL      benign disease   • CHOLECYSTECTOMY     • LAPAROTOMY      partial oophorectomy--benign disease       Exam:   /68 (BP Location: Left arm, Patient Position: Sitting, BP Cuff Size: Adult)   Pulse 75   Temp 36.8 °C (98.2  "°F) (Temporal)   Ht 1.499 m (4' 11\")   Wt 54.7 kg (120 lb 9.5 oz)   SpO2 96%  Body mass index is 24.36 kg/m².  Cardiovascular: Normal rate, regular rhythm and normal heart sounds.   Pulmonary/Chest: Effort normal and breath sounds normal. No respiratory distress.   Abdominal: Soft. Bowel sounds are normal. She exhibits no distension. There is no abdominal tenderness.   Musculoskeletal:         General: No edema.   Hearing good.    Dentition upper dentures  Alert, oriented in no acute distress.  Eye contact is good, speech goal directed, affect calm    Assessment and Plan. The following treatment and monitoring plan is recommended:    1. Dementia associated with other underlying disease with behavioral disturbance (HCC)  2. Meningioma (HCC)  3. Seizure disorder (HCC)  Dementia is chronic and worsening.  I have placed a referral to neurology for further work-up and evaluation.  We discussed adding vitamin B12 injections to help with memory, but will wait for neurology's input.  - Initial Annual Wellness Visit - Includes PPPS ()  - REFERRAL TO NEUROLOGY    4. Stage 3a chronic kidney disease (HCC)  Chronic and stable.  Continue to monitor.  - Initial Annual Wellness Visit - Includes PPPS ()    5. Essential hypertension  Chronic and stable.  Continue same medications.    Services suggested: No services needed at this time  Health Care Screening: Age-appropriate preventive services recommended by USPTF and ACIP covered by Medicare were discussed today. Services ordered if indicated and agreed upon by the patient.  Referrals offered: Community-based lifestyle interventions to reduce health risks and promote self-management and wellness, fall prevention, nutrition, physical activity, tobacco-use cessation, weight loss, and mental health services as per orders if indicated.    Discussion today about general wellness and lifestyle habits:    · Prevent falls and reduce trip hazards; Cautioned about securing or " removing rugs.  · Have a working fire alarm and carbon monoxide detector;   · Engage in regular physical activity and social activities     Follow-up: Return in about 6 months (around 12/23/2021) for Chronic disease management.     Annual Health Assessment Questions:    1.  Are you currently engaging in any exercise or physical activity? Yes    2.  How would you describe your mood or emotional well-being today? good    3.  Have you had any falls in the last year? No    4.  Have you noticed any problems with your balance or had difficulty walking? Yes    5.  In the last six months have you experienced any leakage of urine? No    6. DPA/Advanced Directive: Patient has Advanced Directive on file.        Obtain UA & discharge home w/ outpatient follow up with or without antibiotics depending on the UA. UA positive for UTI; will discharge home w/ abx & outpatient follow up with Gyn & urology

## 2021-07-05 DIAGNOSIS — F43.21 GRIEF: ICD-10-CM

## 2021-07-05 DIAGNOSIS — F32.1 MODERATE SINGLE CURRENT EPISODE OF MAJOR DEPRESSIVE DISORDER (HCC): ICD-10-CM

## 2021-07-07 RX ORDER — DOXEPIN HYDROCHLORIDE 10 MG/1
CAPSULE ORAL
Qty: 90 CAPSULE | Refills: 3 | Status: SHIPPED | OUTPATIENT
Start: 2021-07-07

## 2021-08-06 ENCOUNTER — OFFICE VISIT (OUTPATIENT)
Dept: NEUROLOGY | Facility: MEDICAL CENTER | Age: 86
End: 2021-08-06
Attending: PSYCHIATRY & NEUROLOGY
Payer: MEDICARE

## 2021-08-06 VITALS
BODY MASS INDEX: 25.16 KG/M2 | OXYGEN SATURATION: 95 % | DIASTOLIC BLOOD PRESSURE: 72 MMHG | WEIGHT: 124.78 LBS | TEMPERATURE: 97.7 F | HEIGHT: 59 IN | SYSTOLIC BLOOD PRESSURE: 140 MMHG | HEART RATE: 77 BPM

## 2021-08-06 DIAGNOSIS — F03.B0 MODERATE DEMENTIA WITHOUT BEHAVIORAL DISTURBANCE (HCC): ICD-10-CM

## 2021-08-06 PROCEDURE — 99211 OFF/OP EST MAY X REQ PHY/QHP: CPT | Performed by: PSYCHIATRY & NEUROLOGY

## 2021-08-06 PROCEDURE — 99204 OFFICE O/P NEW MOD 45 MIN: CPT | Performed by: PSYCHIATRY & NEUROLOGY

## 2021-08-06 ASSESSMENT — FIBROSIS 4 INDEX: FIB4 SCORE: 5.48
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
## 2021-08-24 ENCOUNTER — HOSPITAL ENCOUNTER (OUTPATIENT)
Dept: LAB | Facility: MEDICAL CENTER | Age: 86
End: 2021-08-24
Attending: PSYCHIATRY & NEUROLOGY
Payer: MEDICARE

## 2021-08-24 DIAGNOSIS — F03.B0 MODERATE DEMENTIA WITHOUT BEHAVIORAL DISTURBANCE (HCC): ICD-10-CM

## 2021-08-24 PROBLEM — D70.8 OTHER NEUTROPENIA (HCC): Status: ACTIVE | Noted: 2021-08-24

## 2021-08-24 LAB
ALBUMIN SERPL BCP-MCNC: 3.8 G/DL (ref 3.2–4.9)
ALBUMIN/GLOB SERPL: 1.5 G/DL
ALP SERPL-CCNC: 74 U/L (ref 30–99)
ALT SERPL-CCNC: 11 U/L (ref 2–50)
ANION GAP SERPL CALC-SCNC: 7 MMOL/L (ref 7–16)
AST SERPL-CCNC: 16 U/L (ref 12–45)
BASOPHILS # BLD AUTO: 0.6 % (ref 0–1.8)
BASOPHILS # BLD: 0.02 K/UL (ref 0–0.12)
BILIRUB SERPL-MCNC: 0.8 MG/DL (ref 0.1–1.5)
BUN SERPL-MCNC: 21 MG/DL (ref 8–22)
CALCIUM SERPL-MCNC: 9.5 MG/DL (ref 8.5–10.5)
CHLORIDE SERPL-SCNC: 108 MMOL/L (ref 96–112)
CO2 SERPL-SCNC: 29 MMOL/L (ref 20–33)
CREAT SERPL-MCNC: 1.18 MG/DL (ref 0.5–1.4)
EOSINOPHIL # BLD AUTO: 0.09 K/UL (ref 0–0.51)
EOSINOPHIL NFR BLD: 2.8 % (ref 0–6.9)
ERYTHROCYTE [DISTWIDTH] IN BLOOD BY AUTOMATED COUNT: 52.5 FL (ref 35.9–50)
FOLATE SERPL-MCNC: 8 NG/ML
GLOBULIN SER CALC-MCNC: 2.6 G/DL (ref 1.9–3.5)
GLUCOSE SERPL-MCNC: 107 MG/DL (ref 65–99)
HCT VFR BLD AUTO: 39.1 % (ref 37–47)
HGB BLD-MCNC: 12.4 G/DL (ref 12–16)
IMM GRANULOCYTES # BLD AUTO: 0 K/UL (ref 0–0.11)
IMM GRANULOCYTES NFR BLD AUTO: 0 % (ref 0–0.9)
LYMPHOCYTES # BLD AUTO: 0.98 K/UL (ref 1–4.8)
LYMPHOCYTES NFR BLD: 30.1 % (ref 22–41)
MCH RBC QN AUTO: 32.7 PG (ref 27–33)
MCHC RBC AUTO-ENTMCNC: 31.7 G/DL (ref 33.6–35)
MCV RBC AUTO: 103.2 FL (ref 81.4–97.8)
MONOCYTES # BLD AUTO: 0.32 K/UL (ref 0–0.85)
MONOCYTES NFR BLD AUTO: 9.8 % (ref 0–13.4)
NEUTROPHILS # BLD AUTO: 1.85 K/UL (ref 2–7.15)
NEUTROPHILS NFR BLD: 56.7 % (ref 44–72)
NRBC # BLD AUTO: 0 K/UL
NRBC BLD-RTO: 0 /100 WBC
PLATELET # BLD AUTO: 92 K/UL (ref 164–446)
PMV BLD AUTO: 11.2 FL (ref 9–12.9)
POTASSIUM SERPL-SCNC: 3.9 MMOL/L (ref 3.6–5.5)
PROT SERPL-MCNC: 6.4 G/DL (ref 6–8.2)
RBC # BLD AUTO: 3.79 M/UL (ref 4.2–5.4)
SODIUM SERPL-SCNC: 144 MMOL/L (ref 135–145)
TSH SERPL DL<=0.005 MIU/L-ACNC: 3.15 UIU/ML (ref 0.38–5.33)
VIT B12 SERPL-MCNC: 272 PG/ML (ref 211–911)
WBC # BLD AUTO: 3.3 K/UL (ref 4.8–10.8)

## 2021-08-24 PROCEDURE — 36415 COLL VENOUS BLD VENIPUNCTURE: CPT

## 2021-08-24 PROCEDURE — 82607 VITAMIN B-12: CPT

## 2021-08-24 PROCEDURE — 84443 ASSAY THYROID STIM HORMONE: CPT

## 2021-08-24 PROCEDURE — 80053 COMPREHEN METABOLIC PANEL: CPT

## 2021-08-24 PROCEDURE — 83921 ORGANIC ACID SINGLE QUANT: CPT

## 2021-08-24 PROCEDURE — 82746 ASSAY OF FOLIC ACID SERUM: CPT

## 2021-08-24 PROCEDURE — 84425 ASSAY OF VITAMIN B-1: CPT

## 2021-08-24 PROCEDURE — 85025 COMPLETE CBC W/AUTO DIFF WBC: CPT

## 2021-08-28 LAB — METHYLMALONATE SERPL-SCNC: 1.33 UMOL/L (ref 0–0.4)

## 2021-08-29 LAB — VIT B1 BLD-MCNC: 92 NMOL/L (ref 70–180)

## 2021-09-16 ENCOUNTER — DOCUMENTATION (OUTPATIENT)
Dept: NEUROLOGY | Facility: MEDICAL CENTER | Age: 86
End: 2021-09-16

## 2021-09-16 DIAGNOSIS — F03.B18 MODERATE DEMENTIA WITH BEHAVIORAL DISTURBANCE (HCC): ICD-10-CM

## 2021-09-16 NOTE — PROGRESS NOTES
Home Health Referral just made by me at request of daughter Emerita.   Alix has an advanced (atleast moderate stage of dementia).

## 2021-09-17 ENCOUNTER — TELEPHONE (OUTPATIENT)
Dept: NEUROLOGY | Facility: MEDICAL CENTER | Age: 86
End: 2021-09-17

## 2021-09-17 NOTE — TELEPHONE ENCOUNTER
Called pt to relay 's message. Pt did not answer. Left a VM for Emerita. Told her that  had placed a referral for home health, and that he put Alix needed 24/7 care. Told pt to call back if she needed anything else.

## 2021-09-20 ENCOUNTER — TELEPHONE (OUTPATIENT)
Dept: NEUROLOGY | Facility: MEDICAL CENTER | Age: 86
End: 2021-09-20

## 2021-09-20 NOTE — TELEPHONE ENCOUNTER
Jb from [facility] called about this pt's referral. He said he already looked through the pt's chart, and said they need more clinical documentation to approve the referral. They want more clinical information, something along the lines of: Did she have an episode recently? Does she need medication management? Does she need wound care? Etc. Anything like that could help. Once written, I can fax it over to Jb. Jb also said that they are only able to provide, at most, once a week 40 minute visits with a nurse. The referral asked for 24/7 care, but Jb said they are most likely unable to provide that. That would be something that a private home care company would provide. Please advise.

## 2021-09-21 ENCOUNTER — TELEPHONE (OUTPATIENT)
Dept: NEUROLOGY | Facility: MEDICAL CENTER | Age: 86
End: 2021-09-21

## 2021-09-21 ENCOUNTER — DOCUMENTATION (OUTPATIENT)
Dept: NEUROLOGY | Facility: MEDICAL CENTER | Age: 86
End: 2021-09-21

## 2021-09-21 DIAGNOSIS — F03.B0 MODERATE DEMENTIA WITHOUT BEHAVIORAL DISTURBANCE (HCC): ICD-10-CM

## 2021-09-21 NOTE — TELEPHONE ENCOUNTER
Ashley, RN with Home health, said she visited Alix today and the family requested a physical therapist, occupational therapist,  and caregiver for the daytime. She believes  has already put in an order for a CNA to help with showers in the original referral. Family says Alix needs a lot of help. Nurse asked if  was able to help with referrals to those. She also said she would be in contact with [facility] to let him know that Home health will be taking care of Alix. Please advise.

## 2021-09-21 NOTE — TELEPHONE ENCOUNTER
Called Ashley, pt's Home health nurse, to tell her that  placed another referral, and said okay to all the requests (PT, OT, SW, etc.). Ashley said that looked great, and that she had already been in contact with Jb from [facility] to tell him that [facility] will take over Alix's at home care.

## 2021-09-24 ENCOUNTER — TELEPHONE (OUTPATIENT)
Dept: MEDICAL GROUP | Facility: MEDICAL CENTER | Age: 86
End: 2021-09-24

## 2021-09-24 ENCOUNTER — TELEPHONE (OUTPATIENT)
Dept: NEUROLOGY | Facility: MEDICAL CENTER | Age: 86
End: 2021-09-24

## 2021-09-24 NOTE — TELEPHONE ENCOUNTER
LVM for patient to establish with a new provider. [facility] has sent over requests for signatures but no PCP is established with patient to sign orders.

## 2021-09-24 NOTE — TELEPHONE ENCOUNTER
Called [facility] to clarify the fax they sent over to us. They clarified and I told them I would send over the fax with 's signature after he signs it.

## 2021-11-01 ENCOUNTER — DOCUMENTATION (OUTPATIENT)
Dept: NEUROLOGY | Facility: MEDICAL CENTER | Age: 86
End: 2021-11-01

## 2021-11-01 DIAGNOSIS — F03.B0 MODERATE DEMENTIA WITHOUT BEHAVIORAL DISTURBANCE (HCC): ICD-10-CM

## 2021-11-02 ENCOUNTER — PATIENT OUTREACH (OUTPATIENT)
Dept: HEALTH INFORMATION MANAGEMENT | Facility: OTHER | Age: 86
End: 2021-11-02

## 2021-11-03 ENCOUNTER — PATIENT OUTREACH (OUTPATIENT)
Dept: HEALTH INFORMATION MANAGEMENT | Facility: OTHER | Age: 86
End: 2021-11-03

## 2021-11-05 ENCOUNTER — PATIENT OUTREACH (OUTPATIENT)
Dept: HEALTH INFORMATION MANAGEMENT | Facility: OTHER | Age: 86
End: 2021-11-05

## 2021-11-10 ENCOUNTER — PATIENT OUTREACH (OUTPATIENT)
Dept: HEALTH INFORMATION MANAGEMENT | Facility: OTHER | Age: 86
End: 2021-11-10

## 2021-11-11 NOTE — PROGRESS NOTES
CCM SW outgoing call to pts daughter, she received the list of group homes that was sent via My Chart.She has toured some assisted living facilities and group homes. At this time pt is in [place] with her sister who is also looking for a group home in [place]. Daughter asked what MD to request the paperwork for placement, SW suggested she contact Jony Woodard. Encouraged to call this SW if anything is needed.

## 2021-11-11 NOTE — PROGRESS NOTES
[facility] SW outgoing call to pt's daughter Emerita, per request of Jony Woodard.  Pt is presently in [place], [place] with her sister. Emerita would like some help on finding placement for her mother. SW discussed situation and pt needs with Emerita. SW provided a list of group home in the area. Encouraged to call with needs for assistance.

## 2021-11-15 ENCOUNTER — APPOINTMENT (OUTPATIENT)
Dept: MEDICAL GROUP | Facility: MEDICAL CENTER | Age: 86
End: 2021-11-15
Payer: MEDICARE

## 2021-11-15 ENCOUNTER — PATIENT OUTREACH (OUTPATIENT)
Dept: HEALTH INFORMATION MANAGEMENT | Facility: OTHER | Age: 86
End: 2021-11-15

## 2021-11-15 NOTE — PROGRESS NOTES
CCM SW received email from pt's DIL stating pt's PCP is no longer at [facility] and pt needs referral for [facility] placement. [facility]

## 2021-11-18 ENCOUNTER — PATIENT OUTREACH (OUTPATIENT)
Dept: HEALTH INFORMATION MANAGEMENT | Facility: OTHER | Age: 86
End: 2021-11-18

## 2021-11-18 NOTE — PROGRESS NOTES
[facility] SW outgoing call to pt's daughter Emerita. Pt is being admitted to the [facility] on Monday.

## 2021-12-09 PROBLEM — K59.01 SLOW TRANSIT CONSTIPATION: Status: ACTIVE | Noted: 2021-12-09

## 2022-02-02 PROBLEM — F13.20 BENZODIAZEPINE DEPENDENCE (HCC): Status: ACTIVE | Noted: 2022-02-02

## 2022-02-02 PROBLEM — U07.1 COVID-19: Status: ACTIVE | Noted: 2022-02-02

## 2022-02-02 PROBLEM — R47.01 APHASIA: Status: ACTIVE | Noted: 2022-02-02

## 2022-12-12 NOTE — PROGRESS NOTES
Patient comes in.  She is with her daughter-in-law.  She has had no more falls.  She has had some decreased vision in her left eye and has not seen an eye doctor for quite some time.  She denies pain in the left eye.  She does have a history of of CVA and has been referred to Dr. Weir but has not made an appointment to see him.  She has no chest pains and no shortness of breath.    I reviewed the following    Past Medical History:   Diagnosis Date   • Arrhythmia    • Bronchitis    • Depression    • Fall    • Hypertension    • Indigestion    • Renal disorder     stones   • Seizure (HCC)    • Stroke (HCC)     TIAs and CVA   • UTI (lower urinary tract infection) 7/18/2013        Past Surgical History:   Procedure Laterality Date   • EYE SURGERY  2007    bilateral cataracts   • COLONOSCOPY  2004    normal   • ABDOMINAL HYSTERECTOMY TOTAL  1974    benign disease   • CHOLECYSTECTOMY  1960   • LAPAROTOMY  1957    partial oophorectomy--benign disease       Allergies   Allergen Reactions   • Bupropion    • Cymbalta [Duloxetine Hcl] Nausea   • Darvocet [Propoxyphene N-Apap]      Dizziness     • Fluoxetine      suicidal thoughts      • Iodine    • Lexapro    • Lipitor [Atorvastatin Calcium]      muscle aches         Current Outpatient Medications   Medication Sig Dispense Refill   • simvastatin (ZOCOR) 40 MG Tab TAKE 1 TABLET BY MOUTH EVERY EVENING 90 Tab 3   • memantine (NAMENDA) 5 MG Tab Take 1 Tab by mouth 2 times a day. 180 Tab 3   • levETIRAcetam (KEPPRA) 500 MG Tab Take 1 Tab by mouth 2 times a day. 180 Tab 3   • doxepin (SINEQUAN) 10 MG Cap Take 1 Cap by mouth every bedtime. 90 Cap 3   • benazepril (LOTENSIN) 5 MG Tab TAKE 1 TABLET BY MOUTH TWICE A  Tab 3   • aspirin EC (ECOTRIN) 81 MG Tablet Delayed Response Take 81 mg by mouth 2 Times a Day.     • vitamin D (CHOLECALCIFEROL) 1000 UNIT Tab Take 1,000 Units by mouth every day.       No current facility-administered medications for this visit.         Family  History   Problem Relation Age of Onset   • Cancer Mother    • Heart Disease Mother    • Arthritis Mother         Lupus   • Heart Disease Father    • Cancer Brother    • Lung Disease Sister          from Lung Cancer at 70       Social History     Socioeconomic History   • Marital status:      Spouse name: Not on file   • Number of children: Not on file   • Years of education: Not on file   • Highest education level: Not on file   Occupational History   • Not on file   Social Needs   • Financial resource strain: Not on file   • Food insecurity:     Worry: Not on file     Inability: Not on file   • Transportation needs:     Medical: Not on file     Non-medical: Not on file   Tobacco Use   • Smoking status: Never Smoker   • Smokeless tobacco: Never Used   Substance and Sexual Activity   • Alcohol use: Yes     Comment: rare   • Drug use: No   • Sexual activity: Yes     Partners: Male     Birth control/protection: Post-Menopausal     Comment:    Lifestyle   • Physical activity:     Days per week: Not on file     Minutes per session: Not on file   • Stress: Not on file   Relationships   • Social connections:     Talks on phone: Not on file     Gets together: Not on file     Attends [employer] service: Not on file     Active member of club or organization: Not on file     Attends meetings of clubs or organizations: Not on file     Relationship status: Not on file   • Intimate partner violence:     Fear of current or ex partner: Not on file     Emotionally abused: Not on file     Physically abused: Not on file     Forced sexual activity: Not on file   Other Topics Concern   • Not on file   Social History Narrative   • Not on file      Physical Exam   Constitutional: She is oriented. She appears well-developed and well-nourished. No distress.   HENT:  Head: Normocephalic and atraumatic.   Right Ear: External ear normal. Ear canal and TM normal   Left Ear: External ear normal. Ear canal and TM normal  Nose:  Nose normal.   Mouth/Throat: Oropharynx is clear and moist.   Eyes: I do not see anything unusual in her left eye.  Conjunctivae and extraocular motions are normal. Pupils are equal, round, and reactive to light. Fundi benign bilaterally   Neck: No thyromegaly present.   Cardiovascular: Normal rate, regular rhythm, normal heart sounds and intact distal pulses.  Exam reveals no gallop.    No murmur heard.  Pulmonary/Chest: Effort normal and breath sounds normal. No respiratory distress. She has no wheezes. She has no rales.   Abdominal: Soft. Bowel sounds are normal. No hepatosplenomegaly She exhibits no distension. No tenderness. She has no rebound and no guarding.   Musculoskeletal: Normal range of motion. She exhibits no edema and no tenderness.   Lymphadenopathy:     She has no cervical adenopathy.   No supraclavicular adenopathy  Neurological: She is alert and oriented. She has normal reflexes.        Babinskis downgoing bilaterally   Skin: Skin is warm and dry. No rash noted. No erythema.   Psychiatric: She has a normal mood and appropriate affect. Her behavior is normal. Judgment and thought content normal.     1. Change in vision  REFERRAL TO OPHTHALMOLOGY--Dr. Alegria or partner   2. Essential hypertension   controlled   3. Gastroesophageal reflux disease with esophagitis   stable   4. Cerebrovascular accident (CVA) due to bilateral thrombosis of posterior cerebral arteries (HCC)   I gave the patient her referral to Dr. Weir with his phone number and they will make an appointment.  Her daughter-in-law will do this she says.   5. Dementia without behavioral disturbance, unspecified dementia type (HCC)   follow-up with Dr. Weir     Please note that this dictation was created using voice recognition software. I have worked with consultants from the vendor as well as technical experts from [employer] to optimize the interface. I have made every reasonable attempt to correct obvious errors, but I expect that  there are errors of grammar and possibly content that I did not discover before finalizing the note.    Recheck with me 2 months or as needed     normal...

## 2023-11-29 ENCOUNTER — PATIENT MESSAGE (OUTPATIENT)
Dept: HEALTH INFORMATION MANAGEMENT | Facility: OTHER | Age: 88
End: 2023-11-29